# Patient Record
Sex: MALE | Race: WHITE | NOT HISPANIC OR LATINO
[De-identification: names, ages, dates, MRNs, and addresses within clinical notes are randomized per-mention and may not be internally consistent; named-entity substitution may affect disease eponyms.]

---

## 2017-12-21 ENCOUNTER — APPOINTMENT (OUTPATIENT)
Dept: HEART AND VASCULAR | Facility: CLINIC | Age: 82
End: 2017-12-21
Payer: MEDICARE

## 2017-12-21 VITALS
OXYGEN SATURATION: 98 % | SYSTOLIC BLOOD PRESSURE: 134 MMHG | DIASTOLIC BLOOD PRESSURE: 70 MMHG | WEIGHT: 156.37 LBS | TEMPERATURE: 98.1 F | HEIGHT: 67 IN | BODY MASS INDEX: 24.54 KG/M2 | HEART RATE: 45 BPM

## 2017-12-21 PROCEDURE — 93000 ELECTROCARDIOGRAM COMPLETE: CPT

## 2017-12-21 PROCEDURE — 99214 OFFICE O/P EST MOD 30 MIN: CPT | Mod: 25

## 2018-02-01 ENCOUNTER — APPOINTMENT (OUTPATIENT)
Dept: NEUROLOGY | Facility: CLINIC | Age: 83
End: 2018-02-01

## 2018-02-26 ENCOUNTER — APPOINTMENT (OUTPATIENT)
Dept: NEUROLOGY | Facility: CLINIC | Age: 83
End: 2018-02-26

## 2018-05-29 ENCOUNTER — APPOINTMENT (OUTPATIENT)
Dept: HEART AND VASCULAR | Facility: CLINIC | Age: 83
End: 2018-05-29
Payer: MEDICARE

## 2018-05-29 ENCOUNTER — TRANSCRIPTION ENCOUNTER (OUTPATIENT)
Age: 83
End: 2018-05-29

## 2018-05-29 VITALS
TEMPERATURE: 98 F | WEIGHT: 154 LBS | DIASTOLIC BLOOD PRESSURE: 60 MMHG | HEIGHT: 66.97 IN | SYSTOLIC BLOOD PRESSURE: 140 MMHG | HEART RATE: 53 BPM | OXYGEN SATURATION: 98 % | BODY MASS INDEX: 24.17 KG/M2

## 2018-05-29 PROCEDURE — 93306 TTE W/DOPPLER COMPLETE: CPT

## 2018-05-29 PROCEDURE — 99214 OFFICE O/P EST MOD 30 MIN: CPT

## 2018-10-02 ENCOUNTER — APPOINTMENT (OUTPATIENT)
Dept: HEART AND VASCULAR | Facility: CLINIC | Age: 83
End: 2018-10-02
Payer: MEDICARE

## 2018-10-02 VITALS
WEIGHT: 150 LBS | HEIGHT: 67.32 IN | BODY MASS INDEX: 23.27 KG/M2 | TEMPERATURE: 98.2 F | OXYGEN SATURATION: 97 % | SYSTOLIC BLOOD PRESSURE: 130 MMHG | HEART RATE: 49 BPM | DIASTOLIC BLOOD PRESSURE: 70 MMHG

## 2018-10-02 PROCEDURE — 99213 OFFICE O/P EST LOW 20 MIN: CPT

## 2019-01-24 ENCOUNTER — APPOINTMENT (OUTPATIENT)
Dept: OPHTHALMOLOGY | Facility: CLINIC | Age: 84
End: 2019-01-24
Payer: MEDICARE

## 2019-01-24 PROCEDURE — 92136 OPHTHALMIC BIOMETRY: CPT

## 2019-01-24 PROCEDURE — 92004 COMPRE OPH EXAM NEW PT 1/>: CPT

## 2019-03-17 ENCOUNTER — TRANSCRIPTION ENCOUNTER (OUTPATIENT)
Age: 84
End: 2019-03-17

## 2019-03-17 ENCOUNTER — EMERGENCY (EMERGENCY)
Facility: HOSPITAL | Age: 84
LOS: 1 days | Discharge: HOME | End: 2019-03-17
Admitting: EMERGENCY MEDICINE
Payer: MEDICARE

## 2019-03-17 VITALS
SYSTOLIC BLOOD PRESSURE: 166 MMHG | RESPIRATION RATE: 20 BRPM | DIASTOLIC BLOOD PRESSURE: 78 MMHG | TEMPERATURE: 98 F | OXYGEN SATURATION: 96 % | HEART RATE: 60 BPM

## 2019-03-17 DIAGNOSIS — R42 DIZZINESS AND GIDDINESS: ICD-10-CM

## 2019-03-17 DIAGNOSIS — R55 SYNCOPE AND COLLAPSE: ICD-10-CM

## 2019-03-17 DIAGNOSIS — Z87.891 PERSONAL HISTORY OF NICOTINE DEPENDENCE: ICD-10-CM

## 2019-03-17 DIAGNOSIS — R00.2 PALPITATIONS: ICD-10-CM

## 2019-03-17 DIAGNOSIS — I10 ESSENTIAL (PRIMARY) HYPERTENSION: ICD-10-CM

## 2019-03-17 DIAGNOSIS — Z88.0 ALLERGY STATUS TO PENICILLIN: ICD-10-CM

## 2019-03-17 DIAGNOSIS — R00.1 BRADYCARDIA, UNSPECIFIED: ICD-10-CM

## 2019-03-17 DIAGNOSIS — E03.9 HYPOTHYROIDISM, UNSPECIFIED: ICD-10-CM

## 2019-03-17 DIAGNOSIS — R00.8 OTHER ABNORMALITIES OF HEART BEAT: ICD-10-CM

## 2019-03-17 LAB
ALBUMIN SERPL ELPH-MCNC: 3.9 G/DL — SIGNIFICANT CHANGE UP (ref 3.4–5)
ALP SERPL-CCNC: 71 U/L — SIGNIFICANT CHANGE UP (ref 40–120)
ALT FLD-CCNC: 21 U/L — SIGNIFICANT CHANGE UP (ref 12–42)
ANION GAP SERPL CALC-SCNC: 13 MMOL/L — SIGNIFICANT CHANGE UP (ref 9–16)
AST SERPL-CCNC: 35 U/L — SIGNIFICANT CHANGE UP (ref 15–37)
BILIRUB SERPL-MCNC: 1.1 MG/DL — SIGNIFICANT CHANGE UP (ref 0.2–1.2)
BUN SERPL-MCNC: 16 MG/DL — SIGNIFICANT CHANGE UP (ref 7–23)
CALCIUM SERPL-MCNC: 9.3 MG/DL — SIGNIFICANT CHANGE UP (ref 8.5–10.5)
CHLORIDE SERPL-SCNC: 107 MMOL/L — SIGNIFICANT CHANGE UP (ref 96–108)
CO2 SERPL-SCNC: 23 MMOL/L — SIGNIFICANT CHANGE UP (ref 22–31)
CREAT SERPL-MCNC: 0.99 MG/DL — SIGNIFICANT CHANGE UP (ref 0.5–1.3)
GLUCOSE SERPL-MCNC: 102 MG/DL — HIGH (ref 70–99)
HCT VFR BLD CALC: 44.3 % — SIGNIFICANT CHANGE UP (ref 39–50)
HGB BLD-MCNC: 15.4 G/DL — SIGNIFICANT CHANGE UP (ref 13–17)
MAGNESIUM SERPL-MCNC: 2.1 MG/DL — SIGNIFICANT CHANGE UP (ref 1.6–2.6)
MCHC RBC-ENTMCNC: 32.1 PG — SIGNIFICANT CHANGE UP (ref 27–34)
MCHC RBC-ENTMCNC: 34.8 G/DL — SIGNIFICANT CHANGE UP (ref 32–36)
MCV RBC AUTO: 92.3 FL — SIGNIFICANT CHANGE UP (ref 80–100)
NT-PROBNP SERPL-SCNC: 253 PG/ML — SIGNIFICANT CHANGE UP
PLATELET # BLD AUTO: 164 K/UL — SIGNIFICANT CHANGE UP (ref 150–400)
POTASSIUM SERPL-MCNC: 4.1 MMOL/L — SIGNIFICANT CHANGE UP (ref 3.5–5.3)
POTASSIUM SERPL-SCNC: 4.1 MMOL/L — SIGNIFICANT CHANGE UP (ref 3.5–5.3)
PROT SERPL-MCNC: 7.3 G/DL — SIGNIFICANT CHANGE UP (ref 6.4–8.2)
RBC # BLD: 4.8 M/UL — SIGNIFICANT CHANGE UP (ref 4.2–5.8)
RBC # FLD: 12.8 % — SIGNIFICANT CHANGE UP (ref 10.3–14.5)
SODIUM SERPL-SCNC: 143 MMOL/L — SIGNIFICANT CHANGE UP (ref 132–145)
TROPONIN I SERPL-MCNC: <0.017 NG/ML — LOW (ref 0.02–0.06)
TSH SERPL-MCNC: 1.83 UIU/ML — SIGNIFICANT CHANGE UP (ref 0.36–3.74)
WBC # BLD: 6.9 K/UL — SIGNIFICANT CHANGE UP (ref 3.8–10.5)
WBC # FLD AUTO: 6.9 K/UL — SIGNIFICANT CHANGE UP (ref 3.8–10.5)

## 2019-03-17 PROCEDURE — 93010 ELECTROCARDIOGRAM REPORT: CPT | Mod: 76

## 2019-03-17 PROCEDURE — 71045 X-RAY EXAM CHEST 1 VIEW: CPT | Mod: 26

## 2019-03-17 PROCEDURE — 99285 EMERGENCY DEPT VISIT HI MDM: CPT | Mod: 25

## 2019-03-17 NOTE — ED ADULT NURSE NOTE - CHIEF COMPLAINT QUOTE
Patient arrives via EMS, picked up from urgent care, states this morning around 8am, patient had an episode of dizziness that passed, but then patient felt his pulse, and it felt "irregular"; once at Urgent care, patient was found to have a low HR, with possible new on-set a.fib/heart block; patient denies any symptoms at this denies; denies chest pain, shortness of breath, palpitations, dizziness/lightheadedness; states his cardiologist is associated with [facility]; received 2 baby ASA prior to arrival

## 2019-03-17 NOTE — ED PROVIDER NOTE - DIAGNOSTIC INTERPRETATION
Xray (wet reads) interpreted by ASHLEY NOLAN   CXR - Cardiac silhouette, aortic knob, mediastinal and hilar contours appear wnl, no acute consolidation, infiltrate, effusion, or PTX. No bony abnormalities noted Xray (wet reads) interpreted by ASHLEY NOLAN   CXR - Cardiac silhouette, aortic knob, and mediastinal contours appear wnl, slight hilar prominence on the R, no acute consolidation, infiltrate, effusion, or PTX. No bony abnormalities noted

## 2019-03-17 NOTE — ED PROVIDER NOTE - PHYSICAL EXAMINATION
Vital Signs - nursing notes reviewed and confirmed  Gen - WDWN M, NAD, comfortable and non-toxic appearing, speaking in full sentences   Skin - warm, dry, intact  HEENT - AT/NC, PERRL, EOMI, no conjunctival injection, moist oral mucosa, TM intact b/l with good cone of lights, o/p clear with no erythema, edema, or exudate, uvula midline, airway patent, neck supple and NT, FROM, no JVD or carotid bruits b/l, no palpable nodes  CV - S1S2, irregularly irregular, no m/r/g   Resp - respiration non-labored, CTAB, symmetric bs b/l, no r/r/w  GI - NABS, soft, ND, NT, no rebound or guarding, no CVAT b/l   MS - w/w/p, no c/c/e, calves supple and NT, distal pulses symmetric b/l, brisk cap refills, +SILT  Neuro - AxOx3, no focal neuro deficits, CN II-XII grossly intact, cerebellar function intact, ambulatory without gait disturbance Vital Signs - nursing notes reviewed and confirmed  Gen - WDWN elderly M, NAD, comfortable and non-toxic appearing, speaking in full sentences   Skin - warm, dry, intact  HEENT - AT/NC, PERRL, EOMI, no conjunctival injection, moist oral mucosa, TM intact b/l with good cone of lights, o/p clear with no erythema, edema, or exudate, uvula midline, airway patent, neck supple and NT, FROM, no JVD or carotid bruits b/l, no palpable nodes  CV - S1S2, R/R/R    Resp - respiration non-labored, CTAB, symmetric bs b/l, no r/r/w  GI - NABS, soft, ND, NT, no rebound or guarding, no CVAT b/l   MS - w/w/p, no c/c/e, calves supple and NT, distal pulses symmetric b/l, brisk cap refills, +SILT  Neuro - AxOx3, no focal neuro deficits, CN II-XII grossly intact, cerebellar function intact, ambulatory without gait disturbance

## 2019-03-17 NOTE — ED PROVIDER NOTE - OBJECTIVE STATEMENT
82 yo M with PMHx of HTN and hypothyroidism, BIBA from  for evaluation of dizziness and near syncope sensation this morning.  Pt reports waking up with dizzy spells lasting for 30 seconds or so and pt palpated his HR and noted to be irregular and went to  for further evaluation.  Had serial EKGs performed and noted marked bradycardia to 40s with PVCs and bigeminy and sent in for further evaluation.  Given 2 ASA PTA to the ED by EMS.  Denies fever, chills, CP, diaphoresis, BARRETO, SOB, orthopnea, cough, hemoptysis, wheezing, peripheral edema, focal weakness, numbness, tingling, paresthesia, HA, LOC, neck pain, N/V/D/C, abdominal pain, change in urinary/bowel function, trauma, fall, rash, and malaise. Private cardiologist - Dr. Armando Verma [phone] 84 yo M with PMHx of HTN, aortic regurg, PAF 15 yrs ago, not on any AC, BPH, and hypothyroidism, BIBA from  for evaluation of dizziness and near syncope sensation this morning.  Pt reports waking up with dizzy spells lasting for 30 seconds or so and pt palpated his HR and noted to be irregular and went to  for further evaluation.  Had serial EKGs performed and noted marked bradycardia to 40s with PVCs and bigeminy and sent in for further evaluation.  Given 2 ASA PTA to the ED by EMS.  Currently reports no active sx and back to baseline.  Denies fever, chills, CP, diaphoresis, BARRETO, SOB, orthopnea, cough, hemoptysis, wheezing, peripheral edema, focal weakness, numbness, tingling, paresthesia, HA, LOC, neck pain, N/V/D/C, abdominal pain, change in urinary/bowel function, trauma, fall, rash, and malaise. Private cardiologist - Dr. Armando Verma [phone], last TTE 6 months ago with aortic regurg but no other abnormalities, last stress test 3 yrs ago with non obstructive dz.  No recent changes in meds noted. Reports similar episode in the remote past, found to have PAF and resolved without any meds noted

## 2019-03-17 NOTE — ED ADULT TRIAGE NOTE - CHIEF COMPLAINT QUOTE
Patient arrives via EMS, picked up from urgent care, states this morning around 8am, patient had an episode of dizziness that passed, but then patient felt his pulse, and it felt "irregular"; once at Urgent care, patient was found to have a low HR, with possible new on-set a.fib/heart block; patient denies any symptoms at this denies; denies chest pain, shortness of breath, palpitations, dizziness/lightheadedness; states his cardiologist is associated with [facility]; received 2 baby ASA prior to arrival

## 2019-03-17 NOTE — ED PROVIDER NOTE - CLINICAL SUMMARY MEDICAL DECISION MAKING FREE TEXT BOX
pt p/w episode dizziness this morning with irregular HR, has baseline sinus bradycardia due to home meds, no change in meds recently, outpt EKGs at  noted bigeminy with sinus bradycardia but no A.fib noted, repeat EKG here with sinus ondina with PVCs, labs and CXR unremarkable, tele monitoring uneventful and pt reports being asymptomatic in the ED here, private cardiologist notified and made aware, s/p recent TTE outpt with baseline AR, CHADsVASc 3, pt p/w episode dizziness this morning with irregular HR, has baseline sinus bradycardia due to home meds, no change in meds recently, outpt EKGs at  noted bigeminy with sinus bradycardia but no A.fib noted, repeat EKG here with sinus ondina with PVCs, labs and CXR unremarkable, tele monitoring uneventful and pt reports being asymptomatic in the ED here, private cardiologist notified and made aware, s/p recent TTE outpt with baseline AR, case also discussed with Dr. Du, will keep home meds and prompt outpt f/u for possible holter, AFVSS at time of d/c, pt non-toxic appearing, results, ddx, and f/u plans discussed with pt at bedside, strict return precautions discussed, prompt return to ER for any worsening or new sx, pt verbalized understanding. pt p/w episode dizziness this morning with irregular HR, has baseline sinus bradycardia due to home meds, no change in meds recently, outpt EKGs at  noted bigeminy with sinus bradycardia but no A.fib noted, repeat EKG here with sinus ondina with PVCs, labs and CXR unremarkable, tele monitoring uneventful and pt reports being asymptomatic in the ED here, private cardiologist notified and made aware, s/p recent TTE outpt with baseline AR, rec decreased dose of bystolic to 5mg daily and will see pt in office on Tuesday, case also discussed with Dr. Du, will keep home meds and prompt outpt f/u for possible holter, AFVSS at time of d/c, pt non-toxic appearing, results, ddx, and f/u plans discussed with pt at bedside, strict return precautions discussed, prompt return to ER for any worsening or new sx, pt verbalized understanding.

## 2019-03-17 NOTE — ED PROVIDER NOTE - PROVIDER TOKENS
PROVIDER:[TOKEN:[[id number]],FOLLOWUP:[1-3 Days]],PROVIDER:[TOKEN:[[id number]],FOLLOWUP:[1-3 Days]]

## 2019-03-17 NOTE — ED PROVIDER NOTE - PROGRESS NOTE DETAILS
home meds - bystolic 10mg, procardia XL 30mg, synthroid 7.5mcg repeat EKG with sinus ondina at 55bpm with no ST-T wave changes

## 2019-03-17 NOTE — ED PROVIDER NOTE - CARE PROVIDERS DIRECT ADDRESSES
[email or web address]

## 2019-03-17 NOTE — ED PROVIDER NOTE - CARE PLAN
Principal Discharge DX:	Near syncope Principal Discharge DX:	Near syncope  Secondary Diagnosis:	Palpitations

## 2019-03-17 NOTE — ED ADULT NURSE NOTE - NSIMPLEMENTINTERV_GEN_ALL_ED
Implemented All Universal Safety Interventions:  [place] to call system. Call bell, personal items and telephone within reach. Instruct patient to call for assistance. Room bathroom lighting operational. Non-slip footwear when patient is off stretcher. Physically safe environment: no spills, clutter or unnecessary equipment. Stretcher in lowest position, wheels locked, appropriate side rails in place.

## 2019-03-17 NOTE — ED PROVIDER NOTE - PMH
HTN (hypertension)    Hypothyroid BPH (benign prostatic hyperplasia)    HTN (hypertension)    Hypothyroid    Paroxysmal atrial fibrillation

## 2019-03-17 NOTE — ED PROVIDER NOTE - CARE PROVIDER_API CALL
Armando Verma)  Cardiovascular Disease; Internal Medicine  [address] [address]  Phone: [phone]  Fax: [phone]  Follow Up Time: 1-3 Days    Delmar Du)  Cardiovascular Disease; Internal Medicine  [address] [facility] [place] [address]  Phone: [phone]  Fax: [phone]  Follow Up Time: 1-3 Days

## 2019-03-19 ENCOUNTER — RX RENEWAL (OUTPATIENT)
Age: 84
End: 2019-03-19

## 2019-03-19 ENCOUNTER — APPOINTMENT (OUTPATIENT)
Dept: HEART AND VASCULAR | Facility: CLINIC | Age: 84
End: 2019-03-19
Payer: MEDICARE

## 2019-03-19 VITALS
WEIGHT: 145.99 LBS | OXYGEN SATURATION: 97 % | DIASTOLIC BLOOD PRESSURE: 60 MMHG | TEMPERATURE: 98.3 F | HEART RATE: 50 BPM | SYSTOLIC BLOOD PRESSURE: 122 MMHG | BODY MASS INDEX: 22.65 KG/M2 | HEIGHT: 67.32 IN

## 2019-03-19 DIAGNOSIS — E03.9 HYPOTHYROIDISM, UNSPECIFIED: ICD-10-CM

## 2019-03-19 PROCEDURE — 99214 OFFICE O/P EST MOD 30 MIN: CPT

## 2019-03-19 PROCEDURE — 93000 ELECTROCARDIOGRAM COMPLETE: CPT

## 2019-03-19 RX ORDER — LEVOTHYROXINE SODIUM 0.07 MG/1
75 TABLET ORAL DAILY
Qty: 90 | Refills: 3 | Status: ACTIVE | COMMUNITY
Start: 1900-01-01 | End: 1900-01-01

## 2019-03-19 NOTE — REASON FOR VISIT
[FreeTextEntry1] : 2 nights ago at home had some dizziness and became aware of fast and/or irregular pulse.  Went to [facility] where there were no clinical findings other than a single VPC.  He was bradycardiac (which he has been for the past 10 years) and I reduced Bystolic to 5 mg od.\par \par EKG is sinus ondina, rate 50 1 VPC

## 2019-03-19 NOTE — PHYSICAL EXAM

## 2019-04-02 ENCOUNTER — APPOINTMENT (OUTPATIENT)
Dept: HEART AND VASCULAR | Facility: CLINIC | Age: 84
End: 2019-04-02
Payer: MEDICARE

## 2019-04-02 VITALS
SYSTOLIC BLOOD PRESSURE: 139 MMHG | HEART RATE: 49 BPM | WEIGHT: 139 LBS | OXYGEN SATURATION: 97 % | TEMPERATURE: 98.6 F | BODY MASS INDEX: 21.56 KG/M2 | DIASTOLIC BLOOD PRESSURE: 67 MMHG | HEIGHT: 67.32 IN

## 2019-04-02 PROBLEM — I10 ESSENTIAL (PRIMARY) HYPERTENSION: Chronic | Status: ACTIVE | Noted: 2019-03-17

## 2019-04-02 PROBLEM — E03.9 HYPOTHYROIDISM, UNSPECIFIED: Chronic | Status: ACTIVE | Noted: 2019-03-17

## 2019-04-02 PROBLEM — N40.0 BENIGN PROSTATIC HYPERPLASIA WITHOUT LOWER URINARY TRACT SYMPTOMS: Chronic | Status: ACTIVE | Noted: 2019-03-17

## 2019-04-02 PROCEDURE — 99214 OFFICE O/P EST MOD 30 MIN: CPT

## 2019-04-02 PROCEDURE — 93000 ELECTROCARDIOGRAM COMPLETE: CPT

## 2019-04-02 NOTE — REASON FOR VISIT
[FreeTextEntry1] : 83 M HTN  was dizzy checked his BP it was in the 170's . wants to know if he needs more meds.  \par \par EKG is sinus ondina, rate 50 1 VPC

## 2019-04-02 NOTE — ASSESSMENT
[FreeTextEntry1] : htn acceptable today taught him how to properly check bp\par ambulatory blood pressure monitoring no change to meds at this time.

## 2019-04-02 NOTE — PHYSICAL EXAM
[General Appearance - Well Developed] : well developed [Normal Appearance] : normal appearance [Well Groomed] : well groomed [General Appearance - Well Nourished] : well nourished [No Deformities] : no deformities [General Appearance - In No Acute Distress] : no acute distress [Normal Conjunctiva] : the conjunctiva exhibited no abnormalities [Eyelids - No Xanthelasma] : the eyelids demonstrated no xanthelasmas [Normal Oral Mucosa] : normal oral mucosa [No Oral Pallor] : no oral pallor [No Oral Cyanosis] : no oral cyanosis [Normal Jugular Venous A Waves Present] : normal jugular venous A waves present [Normal Jugular Venous V Waves Present] : normal jugular venous V waves present [No Jugular Venous Wolfe A Waves] : no jugular venous wolfe A waves [Respiration, Rhythm And Depth] : normal respiratory rhythm and effort [Exaggerated Use Of Accessory Muscles For Inspiration] : no accessory muscle use [Auscultation Breath Sounds / Voice Sounds] : lungs were clear to auscultation bilaterally [Heart Rate And Rhythm] : heart rate and rhythm were normal [Heart Sounds] : normal S1 and S2 [Murmurs] : no murmurs present [Abdomen Soft] : soft [Abdomen Tenderness] : non-tender [Abdomen Mass (___ Cm)] : no abdominal mass palpated [Abnormal Walk] : normal gait [Gait - Sufficient For Exercise Testing] : the gait was sufficient for exercise testing [Nail Clubbing] : no clubbing of the fingernails [Cyanosis, Localized] : no localized cyanosis [Petechial Hemorrhages (___cm)] : no petechial hemorrhages [Skin Color & Pigmentation] : normal skin color and pigmentation [] : no rash [No Venous Stasis] : no venous stasis [Skin Lesions] : no skin lesions [No Skin Ulcers] : no skin ulcer [No Xanthoma] : no  xanthoma was observed [Oriented To Time, Place, And Person] : oriented to person, place, and time [Affect] : the affect was normal [Mood] : the mood was normal [No Anxiety] : not feeling anxious

## 2019-04-11 ENCOUNTER — APPOINTMENT (OUTPATIENT)
Dept: HEART AND VASCULAR | Facility: CLINIC | Age: 84
End: 2019-04-11
Payer: MEDICARE

## 2019-04-11 PROCEDURE — 93784 AMBL BP MNTR W/SOFTWARE: CPT

## 2019-05-23 ENCOUNTER — APPOINTMENT (OUTPATIENT)
Dept: OPHTHALMOLOGY | Facility: CLINIC | Age: 84
End: 2019-05-23
Payer: MEDICARE

## 2019-05-23 DIAGNOSIS — H25.13 AGE-RELATED NUCLEAR CATARACT, BILATERAL: ICD-10-CM

## 2019-05-23 PROCEDURE — 92012 INTRM OPH EXAM EST PATIENT: CPT

## 2019-06-18 ENCOUNTER — APPOINTMENT (OUTPATIENT)
Dept: HEART AND VASCULAR | Facility: CLINIC | Age: 84
End: 2019-06-18
Payer: MEDICARE

## 2019-06-18 VITALS
TEMPERATURE: 97.8 F | HEIGHT: 67.32 IN | DIASTOLIC BLOOD PRESSURE: 50 MMHG | BODY MASS INDEX: 22.8 KG/M2 | SYSTOLIC BLOOD PRESSURE: 100 MMHG | WEIGHT: 146.98 LBS | HEART RATE: 49 BPM | OXYGEN SATURATION: 98 %

## 2019-06-18 PROCEDURE — 93000 ELECTROCARDIOGRAM COMPLETE: CPT

## 2019-06-18 PROCEDURE — 99214 OFFICE O/P EST MOD 30 MIN: CPT

## 2019-06-18 NOTE — ASSESSMENT
[FreeTextEntry1] : BP  is a little low.  I suspect he no longer needs bystolic - will dc, see him in a few weeks and repeat echo.

## 2019-06-18 NOTE — REASON FOR VISIT
[FreeTextEntry1] : He has had no further dizziness.  Holter confirmed sinus ondina but no other findings.

## 2019-06-18 NOTE — PHYSICAL EXAM
[General Appearance - Well Developed] : well developed [Normal Appearance] : normal appearance [Well Groomed] : well groomed [General Appearance - Well Nourished] : well nourished [No Deformities] : no deformities [General Appearance - In No Acute Distress] : no acute distress [Normal Conjunctiva] : the conjunctiva exhibited no abnormalities [Eyelids - No Xanthelasma] : the eyelids demonstrated no xanthelasmas [Normal Oral Mucosa] : normal oral mucosa [No Oral Pallor] : no oral pallor [No Oral Cyanosis] : no oral cyanosis [Normal Jugular Venous A Waves Present] : normal jugular venous A waves present [Normal Jugular Venous V Waves Present] : normal jugular venous V waves present [No Jugular Venous Wolfe A Waves] : no jugular venous wolfe A waves [Respiration, Rhythm And Depth] : normal respiratory rhythm and effort [Heart Rate And Rhythm] : heart rate and rhythm were normal [Auscultation Breath Sounds / Voice Sounds] : lungs were clear to auscultation bilaterally [Exaggerated Use Of Accessory Muscles For Inspiration] : no accessory muscle use [Heart Sounds] : normal S1 and S2 [Murmurs] : no murmurs present [Abdomen Soft] : soft [Abdomen Tenderness] : non-tender [Abnormal Walk] : normal gait [Abdomen Mass (___ Cm)] : no abdominal mass palpated [Gait - Sufficient For Exercise Testing] : the gait was sufficient for exercise testing [Nail Clubbing] : no clubbing of the fingernails [Cyanosis, Localized] : no localized cyanosis [Petechial Hemorrhages (___cm)] : no petechial hemorrhages [Skin Color & Pigmentation] : normal skin color and pigmentation [] : no rash [No Venous Stasis] : no venous stasis [No Skin Ulcers] : no skin ulcer [Skin Lesions] : no skin lesions [Affect] : the affect was normal [No Xanthoma] : no  xanthoma was observed [Oriented To Time, Place, And Person] : oriented to person, place, and time [No Anxiety] : not feeling anxious [Mood] : the mood was normal

## 2019-07-02 ENCOUNTER — APPOINTMENT (OUTPATIENT)
Dept: HEART AND VASCULAR | Facility: CLINIC | Age: 84
End: 2019-07-02

## 2019-07-09 ENCOUNTER — APPOINTMENT (OUTPATIENT)
Dept: HEART AND VASCULAR | Facility: CLINIC | Age: 84
End: 2019-07-09
Payer: MEDICARE

## 2019-07-09 VITALS
OXYGEN SATURATION: 97 % | BODY MASS INDEX: 23.27 KG/M2 | HEART RATE: 52 BPM | HEIGHT: 67.32 IN | TEMPERATURE: 98.1 F | DIASTOLIC BLOOD PRESSURE: 62 MMHG | SYSTOLIC BLOOD PRESSURE: 110 MMHG | WEIGHT: 150 LBS

## 2019-07-09 PROCEDURE — 93306 TTE W/DOPPLER COMPLETE: CPT

## 2019-07-09 PROCEDURE — 99213 OFFICE O/P EST LOW 20 MIN: CPT

## 2019-07-09 NOTE — PHYSICAL EXAM
[General Appearance - Well Developed] : well developed [Normal Appearance] : normal appearance [Well Groomed] : well groomed [General Appearance - Well Nourished] : well nourished [No Deformities] : no deformities [General Appearance - In No Acute Distress] : no acute distress [Normal Conjunctiva] : the conjunctiva exhibited no abnormalities [Eyelids - No Xanthelasma] : the eyelids demonstrated no xanthelasmas [Normal Oral Mucosa] : normal oral mucosa [No Oral Pallor] : no oral pallor [No Oral Cyanosis] : no oral cyanosis [Normal Jugular Venous A Waves Present] : normal jugular venous A waves present [Normal Jugular Venous V Waves Present] : normal jugular venous V waves present [No Jugular Venous Wolfe A Waves] : no jugular venous wolfe A waves [Exaggerated Use Of Accessory Muscles For Inspiration] : no accessory muscle use [Respiration, Rhythm And Depth] : normal respiratory rhythm and effort [Heart Sounds] : normal S1 and S2 [Auscultation Breath Sounds / Voice Sounds] : lungs were clear to auscultation bilaterally [Heart Rate And Rhythm] : heart rate and rhythm were normal [Abdomen Soft] : soft [Murmurs] : no murmurs present [Abdomen Tenderness] : non-tender [Abdomen Mass (___ Cm)] : no abdominal mass palpated [Abnormal Walk] : normal gait [Gait - Sufficient For Exercise Testing] : the gait was sufficient for exercise testing [Nail Clubbing] : no clubbing of the fingernails [Cyanosis, Localized] : no localized cyanosis [Petechial Hemorrhages (___cm)] : no petechial hemorrhages [] : no rash [Skin Color & Pigmentation] : normal skin color and pigmentation [Skin Lesions] : no skin lesions [No Venous Stasis] : no venous stasis [No Xanthoma] : no  xanthoma was observed [No Skin Ulcers] : no skin ulcer [Oriented To Time, Place, And Person] : oriented to person, place, and time [Affect] : the affect was normal [Mood] : the mood was normal [No Anxiety] : not feeling anxious

## 2019-07-09 NOTE — REASON FOR VISIT
[Hypertension] : hypertension [FreeTextEntry1] : His bystolic had been dc'd, but he felt that his HR was uncomfortably high and resumed it at a dose of 2.5 mg.  Feels fine now.

## 2019-07-12 ENCOUNTER — INPATIENT (INPATIENT)
Facility: HOSPITAL | Age: 84
LOS: 0 days | Discharge: HOME | DRG: 247 | End: 2019-07-13
Attending: INTERNAL MEDICINE | Admitting: INTERNAL MEDICINE
Payer: COMMERCIAL

## 2019-07-12 VITALS
WEIGHT: 152.56 LBS | SYSTOLIC BLOOD PRESSURE: 114 MMHG | DIASTOLIC BLOOD PRESSURE: 67 MMHG | HEART RATE: 75 BPM | TEMPERATURE: 98 F | OXYGEN SATURATION: 97 % | RESPIRATION RATE: 18 BRPM

## 2019-07-12 DIAGNOSIS — I10 ESSENTIAL (PRIMARY) HYPERTENSION: ICD-10-CM

## 2019-07-12 DIAGNOSIS — R00.2 PALPITATIONS: ICD-10-CM

## 2019-07-12 DIAGNOSIS — E03.9 HYPOTHYROIDISM, UNSPECIFIED: ICD-10-CM

## 2019-07-12 LAB
ALBUMIN SERPL ELPH-MCNC: 4.2 G/DL — SIGNIFICANT CHANGE UP (ref 3.3–5)
ALP SERPL-CCNC: 71 U/L — SIGNIFICANT CHANGE UP (ref 40–120)
ALT FLD-CCNC: 17 U/L — SIGNIFICANT CHANGE UP (ref 10–45)
ANION GAP SERPL CALC-SCNC: 9 MMOL/L — SIGNIFICANT CHANGE UP (ref 5–17)
APTT BLD: 35.4 SEC — SIGNIFICANT CHANGE UP (ref 27.5–36.3)
AST SERPL-CCNC: 26 U/L — SIGNIFICANT CHANGE UP (ref 10–40)
BILIRUB SERPL-MCNC: 0.8 MG/DL — SIGNIFICANT CHANGE UP (ref 0.2–1.2)
BUN SERPL-MCNC: 17 MG/DL — SIGNIFICANT CHANGE UP (ref 7–23)
CALCIUM SERPL-MCNC: 9.5 MG/DL — SIGNIFICANT CHANGE UP (ref 8.4–10.5)
CHLORIDE SERPL-SCNC: 111 MMOL/L — HIGH (ref 96–108)
CK MB CFR SERPL CALC: 11.1 NG/ML — HIGH (ref 0–6.7)
CK SERPL-CCNC: 309 U/L — HIGH (ref 30–200)
CO2 SERPL-SCNC: 26 MMOL/L — SIGNIFICANT CHANGE UP (ref 22–31)
CREAT SERPL-MCNC: 0.91 MG/DL — SIGNIFICANT CHANGE UP (ref 0.5–1.3)
GLUCOSE SERPL-MCNC: 120 MG/DL — HIGH (ref 70–99)
HCT VFR BLD CALC: 46.5 % — SIGNIFICANT CHANGE UP (ref 39–50)
HGB BLD-MCNC: 15.7 G/DL — SIGNIFICANT CHANGE UP (ref 13–17)
INR BLD: 1.09 — SIGNIFICANT CHANGE UP (ref 0.88–1.16)
MCHC RBC-ENTMCNC: 31.8 PG — SIGNIFICANT CHANGE UP (ref 27–34)
MCHC RBC-ENTMCNC: 33.8 GM/DL — SIGNIFICANT CHANGE UP (ref 32–36)
MCV RBC AUTO: 94.1 FL — SIGNIFICANT CHANGE UP (ref 80–100)
NRBC # BLD: 0 /100 WBCS — SIGNIFICANT CHANGE UP (ref 0–0)
PLATELET # BLD AUTO: 170 K/UL — SIGNIFICANT CHANGE UP (ref 150–400)
POTASSIUM SERPL-MCNC: 4.1 MMOL/L — SIGNIFICANT CHANGE UP (ref 3.5–5.3)
POTASSIUM SERPL-SCNC: 4.1 MMOL/L — SIGNIFICANT CHANGE UP (ref 3.5–5.3)
PROT SERPL-MCNC: 7.1 G/DL — SIGNIFICANT CHANGE UP (ref 6–8.3)
PROTHROM AB SERPL-ACNC: 12.4 SEC — SIGNIFICANT CHANGE UP (ref 10–12.9)
RBC # BLD: 4.94 M/UL — SIGNIFICANT CHANGE UP (ref 4.2–5.8)
RBC # FLD: 12.5 % — SIGNIFICANT CHANGE UP (ref 10.3–14.5)
SODIUM SERPL-SCNC: 146 MMOL/L — HIGH (ref 135–145)
TROPONIN T SERPL-MCNC: 0.04 NG/ML — CRITICAL HIGH (ref 0–0.01)
WBC # BLD: 6.04 K/UL — SIGNIFICANT CHANGE UP (ref 3.8–10.5)
WBC # FLD AUTO: 6.04 K/UL — SIGNIFICANT CHANGE UP (ref 3.8–10.5)

## 2019-07-12 PROCEDURE — 99223 1ST HOSP IP/OBS HIGH 75: CPT | Mod: AI

## 2019-07-12 PROCEDURE — 71046 X-RAY EXAM CHEST 2 VIEWS: CPT | Mod: 26

## 2019-07-12 PROCEDURE — 99285 EMERGENCY DEPT VISIT HI MDM: CPT | Mod: 25

## 2019-07-12 PROCEDURE — 93458 L HRT ARTERY/VENTRICLE ANGIO: CPT | Mod: 26,59

## 2019-07-12 PROCEDURE — 75571 CT HRT W/O DYE W/CA TEST: CPT | Mod: 26

## 2019-07-12 PROCEDURE — 92941 PRQ TRLML REVSC TOT OCCL AMI: CPT | Mod: LD

## 2019-07-12 RX ORDER — LEVOTHYROXINE SODIUM 125 MCG
75 TABLET ORAL DAILY
Refills: 0 | Status: DISCONTINUED | OUTPATIENT
Start: 2019-07-12 | End: 2019-07-13

## 2019-07-12 RX ORDER — ACETAMINOPHEN 500 MG
325 TABLET ORAL ONCE
Refills: 0 | Status: COMPLETED | OUTPATIENT
Start: 2019-07-12 | End: 2019-07-13

## 2019-07-12 RX ORDER — ASPIRIN/CALCIUM CARB/MAGNESIUM 324 MG
324 TABLET ORAL ONCE
Refills: 0 | Status: COMPLETED | OUTPATIENT
Start: 2019-07-12 | End: 2019-07-12

## 2019-07-12 RX ORDER — CLOPIDOGREL BISULFATE 75 MG/1
600 TABLET, FILM COATED ORAL ONCE
Refills: 0 | Status: DISCONTINUED | OUTPATIENT
Start: 2019-07-12 | End: 2019-07-12

## 2019-07-12 RX ORDER — ATORVASTATIN CALCIUM 80 MG/1
80 TABLET, FILM COATED ORAL AT BEDTIME
Refills: 0 | Status: DISCONTINUED | OUTPATIENT
Start: 2019-07-12 | End: 2019-07-13

## 2019-07-12 RX ORDER — LEVOTHYROXINE SODIUM 125 MCG
0 TABLET ORAL
Qty: 0 | Refills: 0 | DISCHARGE

## 2019-07-12 RX ORDER — NEBIVOLOL HYDROCHLORIDE 5 MG/1
2.5 TABLET ORAL DAILY
Refills: 0 | Status: DISCONTINUED | OUTPATIENT
Start: 2019-07-13 | End: 2019-07-13

## 2019-07-12 RX ORDER — SODIUM CHLORIDE 9 MG/ML
500 INJECTION INTRAMUSCULAR; INTRAVENOUS; SUBCUTANEOUS
Refills: 0 | Status: DISCONTINUED | OUTPATIENT
Start: 2019-07-12 | End: 2019-07-13

## 2019-07-12 RX ORDER — UBIDECARENONE 100 MG
0 CAPSULE ORAL
Qty: 0 | Refills: 0 | DISCHARGE

## 2019-07-12 RX ORDER — NIFEDIPINE 30 MG
30 TABLET, EXTENDED RELEASE 24 HR ORAL EVERY 12 HOURS
Refills: 0 | Status: DISCONTINUED | OUTPATIENT
Start: 2019-07-12 | End: 2019-07-13

## 2019-07-12 RX ORDER — FINASTERIDE 5 MG/1
5 TABLET, FILM COATED ORAL DAILY
Refills: 0 | Status: DISCONTINUED | OUTPATIENT
Start: 2019-07-12 | End: 2019-07-13

## 2019-07-12 RX ORDER — LANOLIN ALCOHOL/MO/W.PET/CERES
3 CREAM (GRAM) TOPICAL AT BEDTIME
Refills: 0 | Status: DISCONTINUED | OUTPATIENT
Start: 2019-07-12 | End: 2019-07-13

## 2019-07-12 RX ORDER — CLOPIDOGREL BISULFATE 75 MG/1
75 TABLET, FILM COATED ORAL DAILY
Refills: 0 | Status: DISCONTINUED | OUTPATIENT
Start: 2019-07-13 | End: 2019-07-13

## 2019-07-12 RX ORDER — ASPIRIN/CALCIUM CARB/MAGNESIUM 324 MG
81 TABLET ORAL DAILY
Refills: 0 | Status: DISCONTINUED | OUTPATIENT
Start: 2019-07-13 | End: 2019-07-13

## 2019-07-12 RX ORDER — NIFEDIPINE 30 MG
0 TABLET, EXTENDED RELEASE 24 HR ORAL
Qty: 0 | Refills: 0 | DISCHARGE

## 2019-07-12 RX ORDER — NEBIVOLOL HYDROCHLORIDE 5 MG/1
0 TABLET ORAL
Qty: 0 | Refills: 0 | DISCHARGE

## 2019-07-12 RX ORDER — TAMSULOSIN HYDROCHLORIDE 0.4 MG/1
0.8 CAPSULE ORAL AT BEDTIME
Refills: 0 | Status: DISCONTINUED | OUTPATIENT
Start: 2019-07-12 | End: 2019-07-13

## 2019-07-12 RX ADMIN — Medication 3 MILLIGRAM(S): at 22:29

## 2019-07-12 RX ADMIN — Medication 324 MILLIGRAM(S): at 09:51

## 2019-07-12 RX ADMIN — SODIUM CHLORIDE 75 MILLILITER(S): 9 INJECTION INTRAMUSCULAR; INTRAVENOUS; SUBCUTANEOUS at 18:58

## 2019-07-12 RX ADMIN — FINASTERIDE 5 MILLIGRAM(S): 5 TABLET, FILM COATED ORAL at 19:13

## 2019-07-12 RX ADMIN — Medication 30 MILLIGRAM(S): at 21:54

## 2019-07-12 RX ADMIN — TAMSULOSIN HYDROCHLORIDE 0.8 MILLIGRAM(S): 0.4 CAPSULE ORAL at 21:54

## 2019-07-12 NOTE — H&P ADULT - ASSESSMENT
85yo male, Fam Hx of MI (father age 60s) with PMHx of HTN, Aortic regurgitation, Hx of palpitations (w/ recent Holter monitor revealing sinus bradycardia per allscrinavneet MD note), hypothyroid, TIA 5yrs ago, Current prostate CA (under surveillance) who presented to [facility] ED 7/12 with c/o palpitations and chest pain, s/p CTA coronaries revealing elevated calcium and admitted for Cath

## 2019-07-12 NOTE — ED PROVIDER NOTE - OBJECTIVE STATEMENT
84M pmh afib, HTN, aortic regurg, PAF 15 yrs ago, no AC, BPH, hypothyroidism, p/w palpitations since last night, resolved this am 30m pta. Also noted some mild generalized nonradiating chest "fullness" this am, noted since he woke up and also resolved approx 30m pta (as pt was getting in cab). No sob, no radiation, no nausea, no vomiting, no diaphoresis. No lightheadedness, tried valsalva last night w/o success. Discused w/ his cardiologist this am Armando Verma, advised come to ed given continued symptoms. Upon arrival, pt requesting discharge w/in 90 minutes for a meeting.   no abd pain, f/c, cough, sob, n/v.

## 2019-07-12 NOTE — ED ADULT NURSE NOTE - OBJECTIVE STATEMENT
Pt presents to the ER complaining of palpitations and chest discomfort. Pt says, "I was in atrial fibrillation for 7 hours last night and I have aortic valve regurgitation. My chest felt full 20 minutes after I got out of bed." Pt was stable upon arrival. Pt denies dizziness, lightheadedness, fever, chills, SOB, chest tightness, nausea, vomiting.

## 2019-07-12 NOTE — H&P ADULT - HISTORY OF PRESENT ILLNESS
83yo male with PMHx of HTN, Aortic regurgitation, Hx of palpitations (w/ Holter monitor in recently), hypothyroid, Current prostate CA (under surveilance) who presented to [facility] ED 7/12 with c/o palpitations and chest pain.  Per patient palpitations began last night around 11pm described a frequent and intermittent heart beats.  He eventually was able to fall asleep but again awoke up the palpitations this morning.  Patient also with c/o mild chest pressure upon standing from bed lasting several minutes and resolved on its own.  Patient also with chronic LE swelling.      On arrival to [facility] ED palpitations now resolved.  EKG revealing Sinus bradycardia 83yo male with PMHx of HTN, Aortic regurgitation, Hx of palpitations (w/ recent Holter monitor revealing sinus bradycardia per sebastian KAUFFMAN note), hypothyroid, TIA 5yrs ago, Current prostate CA (under surveillance) who presented to [facility] ED 7/12 with c/o palpitations and chest pain.  Per patient palpitations began last night around 11pm described a frequent and intermittent heart beats.  He eventually was able to fall asleep but again awoke up the palpitations this morning.  Patient also with c/o mild chest pressure upon standing from bed lasting several minutes and resolved on its own.  Patient also with chronic LE swelling.  He denies any syncope, SOB, orthopnea, PND, fevers, chills.  On arrival to [facility] ED palpitations resolved.  EKG revealing Sinus bradycardia w/ TWI lead II, and minimal ST depression AVF.  VSS.  CXR clear lungs.  Labs significant for elevated Troponin 0.05.  He received ASA 324mg PO x1.  Patient subsequently underwent CTA Calcium score w/ significantly elevated CA score (pre borjas read).  He is admitted for cardiac catheterization. 83yo male, Fam Hx of MI (father age 60s) with PMHx of HTN, Aortic regurgitation, Hx of palpitations (w/ recent Holter monitor revealing sinus bradycardia per sebastian KAUFFMAN note), hypothyroid, TIA 5yrs ago, Current prostate CA (under surveillance) who presented to [facility] ED 7/12 with c/o palpitations and chest pain.  Per patient palpitations began last night around 11pm described a frequent and intermittent heart beats.  He eventually was able to fall asleep but again awoke up the palpitations this morning.  Patient also with c/o mild chest pressure upon standing from bed lasting several minutes and resolved on its own.  Patient also with chronic LE swelling.  He denies any syncope, SOB, orthopnea, PND, fevers, chills.  Patient recently underwent an Echo 7/9/19 revealing normal Right and Left systolic function, EF 65-70%, mild to moderate AR, Ascending aorta 3.7cm.  On arrival to [facility] ED palpitations resolved.  EKG revealing Sinus bradycardia w/ TWI lead II, and minimal ST depression AVF.  VSS.  CXR clear lungs.  Labs significant for elevated Troponin 0.05.  He received ASA 324mg PO x1.  Patient subsequently underwent CTA Calcium score w/ significantly elevated CA score (pre borjas read).  He is admitted for cardiac catheterization.

## 2019-07-12 NOTE — H&P ADULT - ATTENDING COMMENTS
Assessment: Patient personally seen and examined myself during rounds with the   Physician Assistant  ON DATE 7/21/19    Note read, including vitals, physical findings, laboratory data, and radiological reports.   Agree with above and revisions, if any, included below.  - New ACS-chest pain  - Severe chest pain  - Unstable hemodynamics  - Plan of Care: continuous telemetry monitoring for arrhythmias, echocardiogram to evaluate ejection fraction and central pressures, daily weights and I/Os, serial EKGs and lab work to evaluate and replete potassium, magnesium. Troponin added to labs and will cycle. Will need to be seen by EP or Interv Cardio if intervention needed.

## 2019-07-12 NOTE — ED PROVIDER NOTE - CLINICAL SUMMARY MEDICAL DECISION MAKING FREE TEXT BOX
84M pmh afib, HTN, aortic regurg, PAF 15 yrs ago, no AC, BPH, hypothyroidism, p/w palpitations since last night, resolved this am 30m pta and chest "fullness" or discomfort. No acute findings on exam. EKG noted for sinus rhythm, TWI lead III.

## 2019-07-12 NOTE — H&P ADULT - PROBLEM SELECTOR PLAN 3
"Justice Galdamez is a 4 year old male.      Chief Complaint   Patient presents with     Urgent Care     Derm Problem     pt is here for a rash on his back and side - has been there for sometime now and is not getting any better - Mom noticed it over a week ago        Initial Pulse 104  Temp 97.8  F (36.6  C) (Axillary)  Resp 20  Wt 30 lb (13.6 kg)  SpO2 98% Estimated body mass index is 14.89 kg/(m^2) as calculated from the following:    Height as of 5/31/17: 3' 1\" (0.94 m).    Weight as of 5/31/17: 29 lb (13.2 kg).  Medication Reconciliation: complete      Questioned patient about current smoking habits.  Pt. no exposure to second hand smoke.      Erika Mosley CMA      " Continue levothyroxine.

## 2019-07-12 NOTE — ED ADULT NURSE NOTE - CHPI ED NUR SYMPTOMS NEG
no back pain/no congestion/no fever/no nausea/no syncope/no vomiting/no diaphoresis/no dizziness/no chest pain/no chills

## 2019-07-12 NOTE — ED PROVIDER NOTE - DIAGNOSTIC INTERPRETATION
Chest x-ray interpreted by ER Physician Dr. Muse  Findings: heart size normal, no infiltrates, no pleural effusion, no PTX, no appreciated fracture.

## 2019-07-12 NOTE — H&P ADULT - PROBLEM SELECTOR PLAN 1
palpitations and chest pain resolved.  - CE 0.05 x1  - s/p Calcium Score w/ elevated calcium (per attending read almost 2000, w/ significant amount in LAD)  - NPO for Cardiac Cath.  Consented.  on scheduled with Dr. Rey.  s/p ASA 325mg and Plavix 600mg  - recent Echo 7/9/19 if office (allscripts) revealing EF 65-70%, mild to moderate AI.   - f/u Hgb A1c and Lipid panel

## 2019-07-12 NOTE — ED ADULT NURSE NOTE - NSIMPLEMENTINTERV_GEN_ALL_ED
Implemented All Universal Safety Interventions:  [place] to call system. Call bell, personal items and telephone within reach. Instruct patient to call for assistance. Room bathroom lighting operational. Non-slip footwear when patient is off stretcher. Physically safe environment: no spills, clutter or unnecessary equipment. Stretcher in lowest position, wheels locked, appropriate side rails in place.

## 2019-07-13 ENCOUNTER — TRANSCRIPTION ENCOUNTER (OUTPATIENT)
Age: 84
End: 2019-07-13

## 2019-07-13 VITALS — TEMPERATURE: 98 F

## 2019-07-13 LAB
ANION GAP SERPL CALC-SCNC: 11 MMOL/L — SIGNIFICANT CHANGE UP (ref 5–17)
BUN SERPL-MCNC: 12 MG/DL — SIGNIFICANT CHANGE UP (ref 7–23)
CALCIUM SERPL-MCNC: 9 MG/DL — SIGNIFICANT CHANGE UP (ref 8.4–10.5)
CHLORIDE SERPL-SCNC: 108 MMOL/L — SIGNIFICANT CHANGE UP (ref 96–108)
CHOLEST SERPL-MCNC: 189 MG/DL — SIGNIFICANT CHANGE UP (ref 10–199)
CO2 SERPL-SCNC: 24 MMOL/L — SIGNIFICANT CHANGE UP (ref 22–31)
CREAT SERPL-MCNC: 0.74 MG/DL — SIGNIFICANT CHANGE UP (ref 0.5–1.3)
GLUCOSE SERPL-MCNC: 117 MG/DL — HIGH (ref 70–99)
HBA1C BLD-MCNC: 5 % — SIGNIFICANT CHANGE UP (ref 4–5.6)
HCT VFR BLD CALC: 43.7 % — SIGNIFICANT CHANGE UP (ref 39–50)
HDLC SERPL-MCNC: 48 MG/DL — SIGNIFICANT CHANGE UP
HGB BLD-MCNC: 14.7 G/DL — SIGNIFICANT CHANGE UP (ref 13–17)
LIPID PNL WITH DIRECT LDL SERPL: 114 MG/DL — HIGH
MAGNESIUM SERPL-MCNC: 2 MG/DL — SIGNIFICANT CHANGE UP (ref 1.6–2.6)
MCHC RBC-ENTMCNC: 32 PG — SIGNIFICANT CHANGE UP (ref 27–34)
MCHC RBC-ENTMCNC: 33.6 GM/DL — SIGNIFICANT CHANGE UP (ref 32–36)
MCV RBC AUTO: 95.2 FL — SIGNIFICANT CHANGE UP (ref 80–100)
NRBC # BLD: 0 /100 WBCS — SIGNIFICANT CHANGE UP (ref 0–0)
PLATELET # BLD AUTO: 142 K/UL — LOW (ref 150–400)
POTASSIUM SERPL-MCNC: 3.5 MMOL/L — SIGNIFICANT CHANGE UP (ref 3.5–5.3)
POTASSIUM SERPL-SCNC: 3.5 MMOL/L — SIGNIFICANT CHANGE UP (ref 3.5–5.3)
RBC # BLD: 4.59 M/UL — SIGNIFICANT CHANGE UP (ref 4.2–5.8)
RBC # FLD: 12.7 % — SIGNIFICANT CHANGE UP (ref 10.3–14.5)
SODIUM SERPL-SCNC: 143 MMOL/L — SIGNIFICANT CHANGE UP (ref 135–145)
TOTAL CHOLESTEROL/HDL RATIO MEASUREMENT: 3.9 RATIO — SIGNIFICANT CHANGE UP (ref 3.4–9.6)
TRIGL SERPL-MCNC: 136 MG/DL — SIGNIFICANT CHANGE UP (ref 10–149)
WBC # BLD: 5.87 K/UL — SIGNIFICANT CHANGE UP (ref 3.8–10.5)
WBC # FLD AUTO: 5.87 K/UL — SIGNIFICANT CHANGE UP (ref 3.8–10.5)

## 2019-07-13 PROCEDURE — 76775 US EXAM ABDO BACK WALL LIM: CPT | Mod: 26

## 2019-07-13 PROCEDURE — 99238 HOSP IP/OBS DSCHRG MGMT 30/<: CPT

## 2019-07-13 PROCEDURE — 70498 CT ANGIOGRAPHY NECK: CPT | Mod: 26

## 2019-07-13 PROCEDURE — 70496 CT ANGIOGRAPHY HEAD: CPT | Mod: 26

## 2019-07-13 RX ORDER — NEBIVOLOL HYDROCHLORIDE 5 MG/1
1 TABLET ORAL
Qty: 30 | Refills: 2
Start: 2019-07-13 | End: 2019-10-10

## 2019-07-13 RX ORDER — POTASSIUM CHLORIDE 20 MEQ
40 PACKET (EA) ORAL ONCE
Refills: 0 | Status: COMPLETED | OUTPATIENT
Start: 2019-07-13 | End: 2019-07-13

## 2019-07-13 RX ORDER — ASPIRIN/CALCIUM CARB/MAGNESIUM 324 MG
1 TABLET ORAL
Qty: 30 | Refills: 11
Start: 2019-07-13 | End: 2020-07-06

## 2019-07-13 RX ORDER — ASPIRIN/CALCIUM CARB/MAGNESIUM 324 MG
1 TABLET ORAL
Qty: 0 | Refills: 0 | DISCHARGE

## 2019-07-13 RX ORDER — CLOPIDOGREL BISULFATE 75 MG/1
1 TABLET, FILM COATED ORAL
Qty: 30 | Refills: 11
Start: 2019-07-13 | End: 2020-07-06

## 2019-07-13 RX ORDER — POTASSIUM CHLORIDE 20 MEQ
10 PACKET (EA) ORAL ONCE
Refills: 0 | Status: COMPLETED | OUTPATIENT
Start: 2019-07-13 | End: 2019-07-13

## 2019-07-13 RX ORDER — NIFEDIPINE 30 MG
1 TABLET, EXTENDED RELEASE 24 HR ORAL
Qty: 60 | Refills: 2
Start: 2019-07-13 | End: 2019-10-10

## 2019-07-13 RX ORDER — ATORVASTATIN CALCIUM 80 MG/1
1 TABLET, FILM COATED ORAL
Qty: 30 | Refills: 3
Start: 2019-07-13 | End: 2019-11-09

## 2019-07-13 RX ORDER — NEBIVOLOL HYDROCHLORIDE 5 MG/1
1 TABLET ORAL
Qty: 0 | Refills: 0 | DISCHARGE

## 2019-07-13 RX ORDER — NIFEDIPINE 30 MG
1 TABLET, EXTENDED RELEASE 24 HR ORAL
Qty: 0 | Refills: 0 | DISCHARGE

## 2019-07-13 RX ADMIN — Medication 40 MILLIEQUIVALENT(S): at 09:23

## 2019-07-13 RX ADMIN — Medication 81 MILLIGRAM(S): at 11:22

## 2019-07-13 RX ADMIN — CLOPIDOGREL BISULFATE 75 MILLIGRAM(S): 75 TABLET, FILM COATED ORAL at 11:22

## 2019-07-13 RX ADMIN — Medication 325 MILLIGRAM(S): at 01:00

## 2019-07-13 RX ADMIN — Medication 325 MILLIGRAM(S): at 00:02

## 2019-07-13 RX ADMIN — Medication 100 MILLIEQUIVALENT(S): at 09:23

## 2019-07-13 RX ADMIN — Medication 30 MILLIGRAM(S): at 09:23

## 2019-07-13 RX ADMIN — Medication 75 MICROGRAM(S): at 06:57

## 2019-07-13 RX ADMIN — NEBIVOLOL HYDROCHLORIDE 2.5 MILLIGRAM(S): 5 TABLET ORAL at 06:57

## 2019-07-13 NOTE — DISCHARGE NOTE PROVIDER - HOSPITAL COURSE
85yo male, Fam Hx of MI (father age 60s) with PMHx of HTN, Aortic regurgitation, Hx of palpitations (w/ recent Holter monitor revealing sinus bradycardia per sebastian KAUFFMAN note), hypothyroid, TIA 5yrs ago, Current prostate CA (under surveillance) who presented to [facility] ED 7/12 with c/o palpitations and chest pain.  Per patient palpitations began last night around 11pm described a frequent and intermittent heart beats.  He eventually was able to fall asleep but again awoke up the palpitations this morning.  Patient also with c/o mild chest pressure upon standing from bed lasting several minutes and resolved on its own.  Patient also with chronic LE swelling.  He denies any syncope, SOB, orthopnea, PND, fevers, chills.  Patient recently underwent an Echo 7/9/19 revealing normal Right and Left systolic function, EF 65-70%, mild to moderate AR, Ascending aorta 3.7cm.  On arrival to [facility] ED palpitations resolved.  EKG revealing Sinus bradycardia w/ TWI lead II, and minimal ST depression AVF.  VSS.  CXR clear lungs.  Labs significant for elevated Troponin 0.05.  He received ASA 324mg PO x1.  Patient subsequently underwent CT Heart Calcium revealing severe Calcium score at 1861 Agatston units, which is at the 79th percentile, adjusted for age, gender and race.    and Aortic calcifications. He is admitted for cardiac catheterization.     Pt is now s/p cardiac cath on 7/13/19:  CSI/orbital atherectomy/MARCUS to severely calcified mLAD (90%), pLCX aneurysmal 30%,  RCA dominant, pRCA significant aneurysmal changes, pRCA and mRCA 30% stenosis, Right radial access site stable.    Given aneurysmal changes, patient underwent CTA Head/Neck w/ IV contrast and Renal US to assess for aneurysms. Per Dr. Cast, pt can be discharged home prior to results of CTs and Renal US. Pt will follow up with Dr. Verma who will review results with pt.     Pt started on Atorvastatin 80mg QHS and will be d/c home on ASA/Plavix DAPT.     No significant events on telemetry overnight. Repeat EKG without ischemic changes. Patient has been medically cleared for discharge as per Dr. Cast. Patient has been given appropriate discharge instructions including medication regimen, access site management and follow up. Medications that patient needs refills on have been e-prescribed to preferred pharmacy.         Temp 97.1 HR 50 /62 RR 18 SpO2 94% RA    Gen: NAD, A&O x3    Cards: RRR, clear S1 and S2 without murmur    Pulm: CTA B/L without w/r/r    Right Radial Access Site: No hematoma or ooze, peripheral pulses 2+ B/L    Abd: soft, NT    Ext: no LE edema or ulcerations B/L

## 2019-07-13 NOTE — DISCHARGE NOTE PROVIDER - CARE PROVIDER_API CALL
Armando Verma)  Cardiovascular Disease; Internal Medicine  [address] [address]  Phone: [phone]  Fax: [phone]  Follow Up Time:

## 2019-07-13 NOTE — DISCHARGE NOTE PROVIDER - NSDCCPGOAL_GEN_ALL_CORE_FT
Pt states she has been having problems with her right knee for some time now. Pt states yesterday she heard something pop in her knee. Pt has been elevating and icing her knee but the pain is still there.
To get better and follow your care plan as instructed.

## 2019-07-13 NOTE — DISCHARGE NOTE PROVIDER - NSDCCPCAREPLAN_GEN_ALL_CORE_FT
PRINCIPAL DISCHARGE DIAGNOSIS  Diagnosis: CAD (coronary artery disease)  Assessment and Plan of Treatment: You have a diagnosis of coronary artery disease and received a stent to your middle Left Anterior Descending coronary artery. You have been started on Aspirin 81mg daily and Plavix (Clopidogrel) 75mg daily. You MUST continue taking the daily Aspirin and Plavix to ensure your stent does not close. DO NOT STOP THESE MEDICATIONS FOR ANY REASON UNLESS OTHERWISE INDICATED BY YOUR CARDIOLOGIST BECAUSE THIS WILL PUT YOU AT RISK FOR A HEART ATTACK. You should refrain from strenuous activity and heavy lifting for 1 week. Please make a follow up appointment with your cardiologist, Dr. Lunsford within 1 weeks of your discharge. DURING YOUR VISIT WITH DR. LUNSFORD PLEASE MAKE SURE TO REVIEW THE RESULTS OF THE CT SCANS OF YOUR HEAD AND NECK AS WELL AS THE ULTRASOUND OF YOUR KIDNEYS. All of your prescriptions have been sent electronically to your pharmacy.      SECONDARY DISCHARGE DIAGNOSES  Diagnosis: HLD (hyperlipidemia)  Assessment and Plan of Treatment: We have started you on Atorvastatin (Lipitor) 80mg once daily each night to lower your cholesterol and reduce your risk of heart disease progression.    Diagnosis: HTN (hypertension)  Assessment and Plan of Treatment: You have a history of elevated blood pressure and you should continue your blood pressure medications as prescribed.    Diagnosis: Hypothyroid  Assessment and Plan of Treatment: You have a history of hypothyroidism and you should continue your  medications as prescribed.

## 2019-07-13 NOTE — DISCHARGE NOTE NURSING/CASE MANAGEMENT/SOCIAL WORK - NSDCDPATPORTLINK_GEN_ALL_CORE
You can access the [facility] Patient Portal, offered by [facility], by registering with the following website: [email or web address]

## 2019-07-13 NOTE — PROGRESS NOTE ADULT - SUBJECTIVE AND OBJECTIVE BOX
INTERVENTIONAL CARDIOLOGY FOLLOW UP NOTE    -Patient seen and examined this am  -No events overnight  -No complaints this am    T(C): 36.2 (07-13-19 @ 06:04), Max: 36.8 (07-12-19 @ 22:30)  HR: 72 (07-13-19 @ 06:07) (50 - 75)  BP: 132/74 (07-13-19 @ 06:07) (100/53 - 132/74)  RR: 18 (07-13-19 @ 06:07) (18 - 20)  SpO2: 94% (07-13-19 @ 06:07) (94% - 98%)    VASCULAR ACCESS EXAM:     RADIAL:   2+ right/left radial pulse   Normal capillary refill. No evidence of motor or sensory deficits of digits, hand, wrist or forearm.   -Access site clean, non-tender, without ecchymosis or hematoma.    A/P  S/p PCI via radial approach with no evidence of vascular complications post procedure.     -continue with current medications including dual antiplatelet therapy   -discharge instructions as per protocol   -outpatient follow up with primary cardiologist

## 2019-07-23 ENCOUNTER — APPOINTMENT (OUTPATIENT)
Dept: HEART AND VASCULAR | Facility: CLINIC | Age: 84
End: 2019-07-23

## 2019-07-23 ENCOUNTER — APPOINTMENT (OUTPATIENT)
Dept: HEART AND VASCULAR | Facility: CLINIC | Age: 84
End: 2019-07-23
Payer: MEDICARE

## 2019-07-23 VITALS
HEART RATE: 48 BPM | SYSTOLIC BLOOD PRESSURE: 124 MMHG | OXYGEN SATURATION: 98 % | HEIGHT: 67.32 IN | BODY MASS INDEX: 22.8 KG/M2 | TEMPERATURE: 98.6 F | DIASTOLIC BLOOD PRESSURE: 60 MMHG | WEIGHT: 146.98 LBS

## 2019-07-23 DIAGNOSIS — I49.9 CARDIAC ARRHYTHMIA, UNSPECIFIED: ICD-10-CM

## 2019-07-23 PROCEDURE — 99214 OFFICE O/P EST MOD 30 MIN: CPT

## 2019-07-23 PROCEDURE — 93000 ELECTROCARDIOGRAM COMPLETE: CPT

## 2019-07-23 NOTE — HISTORY OF PRESENT ILLNESS
[FreeTextEntry1] : 84 M HTN Hypothyroid TIA Prostate cancer CAD recent hospital admission for irregular heart beat found to have minimally elevated TnI  s/p stent to mid LAD 7/2019 RCA aneurysm normal LVEF  here for follow up feels good no complaints no cp no sob no dizziness no irregular heart beat \par \par ecg sinus bradycardia

## 2019-07-23 NOTE — ASSESSMENT
[FreeTextEntry1] : cad on gdmt continue plavix for six months\par htn controlled\par irregular heart beat holter was normal feels better when he is bradycardic continue bystolic\par fu in one month

## 2019-07-23 NOTE — PHYSICAL EXAM
[General Appearance - Well Developed] : well developed [Normal Appearance] : normal appearance [Well Groomed] : well groomed [General Appearance - Well Nourished] : well nourished [No Deformities] : no deformities [Eyelids - No Xanthelasma] : the eyelids demonstrated no xanthelasmas [Normal Conjunctiva] : the conjunctiva exhibited no abnormalities [General Appearance - In No Acute Distress] : no acute distress [No Oral Pallor] : no oral pallor [Normal Oral Mucosa] : normal oral mucosa [No Oral Cyanosis] : no oral cyanosis [Normal Jugular Venous A Waves Present] : normal jugular venous A waves present [Normal Jugular Venous V Waves Present] : normal jugular venous V waves present [No Jugular Venous Wolfe A Waves] : no jugular venous wolfe A waves [Respiration, Rhythm And Depth] : normal respiratory rhythm and effort [Auscultation Breath Sounds / Voice Sounds] : lungs were clear to auscultation bilaterally [Heart Rate And Rhythm] : heart rate and rhythm were normal [Exaggerated Use Of Accessory Muscles For Inspiration] : no accessory muscle use [Murmurs] : no murmurs present [Abdomen Soft] : soft [Heart Sounds] : normal S1 and S2 [Abdomen Tenderness] : non-tender [Abnormal Walk] : normal gait [Gait - Sufficient For Exercise Testing] : the gait was sufficient for exercise testing [Abdomen Mass (___ Cm)] : no abdominal mass palpated [Petechial Hemorrhages (___cm)] : no petechial hemorrhages [Cyanosis, Localized] : no localized cyanosis [Nail Clubbing] : no clubbing of the fingernails [] : no ischemic changes [Skin Color & Pigmentation] : normal skin color and pigmentation [No Skin Ulcers] : no skin ulcer [No Venous Stasis] : no venous stasis [Skin Lesions] : no skin lesions [No Xanthoma] : no  xanthoma was observed [Oriented To Time, Place, And Person] : oriented to person, place, and time [Affect] : the affect was normal [Mood] : the mood was normal [No Anxiety] : not feeling anxious

## 2019-07-25 DIAGNOSIS — I48.0 PAROXYSMAL ATRIAL FIBRILLATION: ICD-10-CM

## 2019-07-25 DIAGNOSIS — Z88.1 ALLERGY STATUS TO OTHER ANTIBIOTIC AGENTS STATUS: ICD-10-CM

## 2019-07-25 DIAGNOSIS — I25.41 CORONARY ARTERY ANEURYSM: ICD-10-CM

## 2019-07-25 DIAGNOSIS — Z86.73 PERSONAL HISTORY OF TRANSIENT ISCHEMIC ATTACK (TIA), AND CEREBRAL INFARCTION WITHOUT RESIDUAL DEFICITS: ICD-10-CM

## 2019-07-25 DIAGNOSIS — E03.9 HYPOTHYROIDISM, UNSPECIFIED: ICD-10-CM

## 2019-07-25 DIAGNOSIS — I10 ESSENTIAL (PRIMARY) HYPERTENSION: ICD-10-CM

## 2019-07-25 DIAGNOSIS — C61 MALIGNANT NEOPLASM OF PROSTATE: ICD-10-CM

## 2019-07-25 DIAGNOSIS — I25.110 ATHEROSCLEROTIC HEART DISEASE OF NATIVE CORONARY ARTERY WITH UNSTABLE ANGINA PECTORIS: ICD-10-CM

## 2019-07-25 DIAGNOSIS — I35.1 NONRHEUMATIC AORTIC (VALVE) INSUFFICIENCY: ICD-10-CM

## 2019-07-25 DIAGNOSIS — I21.4 NON-ST ELEVATION (NSTEMI) MYOCARDIAL INFARCTION: ICD-10-CM

## 2019-07-25 DIAGNOSIS — Z79.82 LONG TERM (CURRENT) USE OF ASPIRIN: ICD-10-CM

## 2019-07-25 DIAGNOSIS — E78.5 HYPERLIPIDEMIA, UNSPECIFIED: ICD-10-CM

## 2019-07-25 DIAGNOSIS — N40.0 BENIGN PROSTATIC HYPERPLASIA WITHOUT LOWER URINARY TRACT SYMPTOMS: ICD-10-CM

## 2019-07-25 DIAGNOSIS — Z88.0 ALLERGY STATUS TO PENICILLIN: ICD-10-CM

## 2019-08-04 PROCEDURE — 75571 CT HRT W/O DYE W/CA TEST: CPT

## 2019-08-04 PROCEDURE — 99285 EMERGENCY DEPT VISIT HI MDM: CPT

## 2019-08-04 PROCEDURE — 85610 PROTHROMBIN TIME: CPT

## 2019-08-04 PROCEDURE — C1725: CPT

## 2019-08-04 PROCEDURE — 83036 HEMOGLOBIN GLYCOSYLATED A1C: CPT

## 2019-08-04 PROCEDURE — C1874: CPT

## 2019-08-04 PROCEDURE — 92933 PRQ TRLML C ATHRC ST ANGIOP1: CPT

## 2019-08-04 PROCEDURE — 82553 CREATINE MB FRACTION: CPT

## 2019-08-04 PROCEDURE — 93458 L HRT ARTERY/VENTRICLE ANGIO: CPT

## 2019-08-04 PROCEDURE — C1769: CPT

## 2019-08-04 PROCEDURE — 76775 US EXAM ABDO BACK WALL LIM: CPT

## 2019-08-04 PROCEDURE — 85027 COMPLETE CBC AUTOMATED: CPT

## 2019-08-04 PROCEDURE — 84484 ASSAY OF TROPONIN QUANT: CPT

## 2019-08-04 PROCEDURE — 80053 COMPREHEN METABOLIC PANEL: CPT

## 2019-08-04 PROCEDURE — 85730 THROMBOPLASTIN TIME PARTIAL: CPT

## 2019-08-04 PROCEDURE — C1724: CPT

## 2019-08-04 PROCEDURE — C1894: CPT

## 2019-08-04 PROCEDURE — 71046 X-RAY EXAM CHEST 2 VIEWS: CPT

## 2019-08-04 PROCEDURE — C1887: CPT

## 2019-08-04 PROCEDURE — 80048 BASIC METABOLIC PNL TOTAL CA: CPT

## 2019-08-04 PROCEDURE — 36415 COLL VENOUS BLD VENIPUNCTURE: CPT

## 2019-08-04 PROCEDURE — 70498 CT ANGIOGRAPHY NECK: CPT

## 2019-08-04 PROCEDURE — 82550 ASSAY OF CK (CPK): CPT

## 2019-08-04 PROCEDURE — 83735 ASSAY OF MAGNESIUM: CPT

## 2019-08-04 PROCEDURE — 70496 CT ANGIOGRAPHY HEAD: CPT

## 2019-08-04 PROCEDURE — 80061 LIPID PANEL: CPT

## 2019-08-29 ENCOUNTER — APPOINTMENT (OUTPATIENT)
Dept: HEART AND VASCULAR | Facility: CLINIC | Age: 84
End: 2019-08-29
Payer: MEDICARE

## 2019-08-29 VITALS
HEIGHT: 67 IN | SYSTOLIC BLOOD PRESSURE: 127 MMHG | HEART RATE: 58 BPM | OXYGEN SATURATION: 98 % | BODY MASS INDEX: 22.91 KG/M2 | WEIGHT: 146 LBS | DIASTOLIC BLOOD PRESSURE: 73 MMHG | TEMPERATURE: 98 F

## 2019-08-29 PROCEDURE — 99214 OFFICE O/P EST MOD 30 MIN: CPT

## 2019-08-29 RX ORDER — ASPIRIN 81 MG
81 TABLET, DELAYED RELEASE (ENTERIC COATED) ORAL DAILY
Refills: 0 | Status: DISCONTINUED | COMMUNITY
End: 2019-08-29

## 2019-08-29 NOTE — PHYSICAL EXAM
[General Appearance - Well Developed] : well developed [Well Groomed] : well groomed [Normal Appearance] : normal appearance [General Appearance - Well Nourished] : well nourished [No Deformities] : no deformities [Normal Conjunctiva] : the conjunctiva exhibited no abnormalities [General Appearance - In No Acute Distress] : no acute distress [Normal Oral Mucosa] : normal oral mucosa [Eyelids - No Xanthelasma] : the eyelids demonstrated no xanthelasmas [No Oral Pallor] : no oral pallor [No Oral Cyanosis] : no oral cyanosis [Normal Jugular Venous V Waves Present] : normal jugular venous V waves present [Normal Jugular Venous A Waves Present] : normal jugular venous A waves present [No Jugular Venous Wolfe A Waves] : no jugular venous wolfe A waves [Auscultation Breath Sounds / Voice Sounds] : lungs were clear to auscultation bilaterally [Respiration, Rhythm And Depth] : normal respiratory rhythm and effort [Exaggerated Use Of Accessory Muscles For Inspiration] : no accessory muscle use [Heart Rate And Rhythm] : heart rate and rhythm were normal [Heart Sounds] : normal S1 and S2 [Abdomen Soft] : soft [Murmurs] : no murmurs present [Abdomen Tenderness] : non-tender [Abdomen Mass (___ Cm)] : no abdominal mass palpated [Abnormal Walk] : normal gait [Gait - Sufficient For Exercise Testing] : the gait was sufficient for exercise testing [Nail Clubbing] : no clubbing of the fingernails [Cyanosis, Localized] : no localized cyanosis [Petechial Hemorrhages (___cm)] : no petechial hemorrhages [Skin Color & Pigmentation] : normal skin color and pigmentation [] : no rash [No Venous Stasis] : no venous stasis [Skin Lesions] : no skin lesions [No Skin Ulcers] : no skin ulcer [No Xanthoma] : no  xanthoma was observed [Oriented To Time, Place, And Person] : oriented to person, place, and time [Affect] : the affect was normal [No Anxiety] : not feeling anxious [Mood] : the mood was normal

## 2019-08-30 NOTE — ASSESSMENT
[FreeTextEntry1] : cad on gdmt continue plavix for six months stop in january \par htn controlled\par irregular heart beat holter was normal feels better when he is bradycardic continue bystolic\par fu in three months

## 2019-09-26 ENCOUNTER — APPOINTMENT (OUTPATIENT)
Dept: OPHTHALMOLOGY | Facility: CLINIC | Age: 84
End: 2019-09-26
Payer: MEDICARE

## 2019-09-26 ENCOUNTER — NON-APPOINTMENT (OUTPATIENT)
Age: 84
End: 2019-09-26

## 2019-09-26 PROCEDURE — 92012 INTRM OPH EXAM EST PATIENT: CPT

## 2019-10-04 ENCOUNTER — APPOINTMENT (OUTPATIENT)
Dept: HEART AND VASCULAR | Facility: CLINIC | Age: 84
End: 2019-10-04
Payer: MEDICARE

## 2019-10-04 VITALS
HEIGHT: 67 IN | SYSTOLIC BLOOD PRESSURE: 124 MMHG | BODY MASS INDEX: 22.76 KG/M2 | OXYGEN SATURATION: 97 % | DIASTOLIC BLOOD PRESSURE: 60 MMHG | TEMPERATURE: 97.9 F | WEIGHT: 145 LBS | HEART RATE: 57 BPM

## 2019-10-04 PROCEDURE — 99214 OFFICE O/P EST MOD 30 MIN: CPT

## 2019-10-04 NOTE — PHYSICAL EXAM
[General Appearance - Well Developed] : well developed [Normal Appearance] : normal appearance [Well Groomed] : well groomed [General Appearance - Well Nourished] : well nourished [General Appearance - In No Acute Distress] : no acute distress [No Deformities] : no deformities [Normal Conjunctiva] : the conjunctiva exhibited no abnormalities [Eyelids - No Xanthelasma] : the eyelids demonstrated no xanthelasmas [Normal Oral Mucosa] : normal oral mucosa [No Oral Pallor] : no oral pallor [No Oral Cyanosis] : no oral cyanosis [Normal Jugular Venous A Waves Present] : normal jugular venous A waves present [Normal Jugular Venous V Waves Present] : normal jugular venous V waves present [No Jugular Venous Wolfe A Waves] : no jugular venous wolfe A waves [Respiration, Rhythm And Depth] : normal respiratory rhythm and effort [Auscultation Breath Sounds / Voice Sounds] : lungs were clear to auscultation bilaterally [Exaggerated Use Of Accessory Muscles For Inspiration] : no accessory muscle use [Heart Rate And Rhythm] : heart rate and rhythm were normal [Heart Sounds] : normal S1 and S2 [Murmurs] : no murmurs present [Abdomen Soft] : soft [Abdomen Tenderness] : non-tender [Abdomen Mass (___ Cm)] : no abdominal mass palpated [Abnormal Walk] : normal gait [Gait - Sufficient For Exercise Testing] : the gait was sufficient for exercise testing [Nail Clubbing] : no clubbing of the fingernails [Cyanosis, Localized] : no localized cyanosis [Petechial Hemorrhages (___cm)] : no petechial hemorrhages [Skin Color & Pigmentation] : normal skin color and pigmentation [] : no rash [No Venous Stasis] : no venous stasis [Skin Lesions] : no skin lesions [No Xanthoma] : no  xanthoma was observed [No Skin Ulcers] : no skin ulcer [Oriented To Time, Place, And Person] : oriented to person, place, and time [Mood] : the mood was normal [Affect] : the affect was normal [No Anxiety] : not feeling anxious

## 2019-10-04 NOTE — ASSESSMENT
[FreeTextEntry1] : cad on gdmt continue plavix for six months stop in january \par would not stop plavix for dental work \par htn controlled\par irregular heart beat holter was normal feels better when he is bradycardic continue bystolic\par fu in four months

## 2019-10-04 NOTE — HISTORY OF PRESENT ILLNESS
[FreeTextEntry1] : 84 M HTN Hypothyroid TIA Prostate cancer CAD recent hospital admission for irregular heart beat found to have minimally elevated TnI  s/p stent to mid LAD 7/2019 RCA aneurysm normal LVEF  here for follow up feels good no complaints no cp no sob no dizziness no irregular heart beat wants to have extensive dental work \par \par ecg sinus bradycardia

## 2019-11-06 ENCOUNTER — APPOINTMENT (OUTPATIENT)
Dept: OPHTHALMOLOGY | Facility: AMBULATORY SURGERY CENTER | Age: 84
End: 2019-11-06

## 2019-11-07 ENCOUNTER — APPOINTMENT (OUTPATIENT)
Dept: OPHTHALMOLOGY | Facility: CLINIC | Age: 84
End: 2019-11-07

## 2020-01-28 ENCOUNTER — APPOINTMENT (OUTPATIENT)
Dept: HEART AND VASCULAR | Facility: CLINIC | Age: 85
End: 2020-01-28
Payer: MEDICARE

## 2020-01-28 VITALS
HEART RATE: 50 BPM | BODY MASS INDEX: 22.76 KG/M2 | SYSTOLIC BLOOD PRESSURE: 140 MMHG | OXYGEN SATURATION: 98 % | HEIGHT: 67 IN | DIASTOLIC BLOOD PRESSURE: 65 MMHG | TEMPERATURE: 96.7 F | WEIGHT: 145 LBS

## 2020-01-28 DIAGNOSIS — I35.1 NONRHEUMATIC AORTIC (VALVE) INSUFFICIENCY: ICD-10-CM

## 2020-01-28 PROCEDURE — 99214 OFFICE O/P EST MOD 30 MIN: CPT

## 2020-01-28 PROCEDURE — 93000 ELECTROCARDIOGRAM COMPLETE: CPT

## 2020-01-28 NOTE — ASSESSMENT
[FreeTextEntry1] : cad on gdmt continue plavix for six months \par htn controlled\par irregular heart beat holter was normal feels better when he is bradycardic continue bystolic\par fu in six months

## 2020-01-28 NOTE — PHYSICAL EXAM
[Normal Appearance] : normal appearance [General Appearance - Well Developed] : well developed [Well Groomed] : well groomed [No Deformities] : no deformities [General Appearance - Well Nourished] : well nourished [Normal Conjunctiva] : the conjunctiva exhibited no abnormalities [General Appearance - In No Acute Distress] : no acute distress [Normal Oral Mucosa] : normal oral mucosa [Eyelids - No Xanthelasma] : the eyelids demonstrated no xanthelasmas [No Oral Cyanosis] : no oral cyanosis [Normal Jugular Venous A Waves Present] : normal jugular venous A waves present [No Oral Pallor] : no oral pallor [No Jugular Venous Wolfe A Waves] : no jugular venous wolfe A waves [Normal Jugular Venous V Waves Present] : normal jugular venous V waves present [Respiration, Rhythm And Depth] : normal respiratory rhythm and effort [Exaggerated Use Of Accessory Muscles For Inspiration] : no accessory muscle use [Auscultation Breath Sounds / Voice Sounds] : lungs were clear to auscultation bilaterally [Heart Rate And Rhythm] : heart rate and rhythm were normal [Heart Sounds] : normal S1 and S2 [Abdomen Soft] : soft [Murmurs] : no murmurs present [Abdomen Tenderness] : non-tender [Abdomen Mass (___ Cm)] : no abdominal mass palpated [Gait - Sufficient For Exercise Testing] : the gait was sufficient for exercise testing [Abnormal Walk] : normal gait [Nail Clubbing] : no clubbing of the fingernails [Cyanosis, Localized] : no localized cyanosis [Petechial Hemorrhages (___cm)] : no petechial hemorrhages [Skin Color & Pigmentation] : normal skin color and pigmentation [] : no rash [No Venous Stasis] : no venous stasis [Skin Lesions] : no skin lesions [No Skin Ulcers] : no skin ulcer [No Xanthoma] : no  xanthoma was observed [Oriented To Time, Place, And Person] : oriented to person, place, and time [Affect] : the affect was normal [Mood] : the mood was normal [No Anxiety] : not feeling anxious

## 2020-02-11 ENCOUNTER — TRANSCRIPTION ENCOUNTER (OUTPATIENT)
Age: 85
End: 2020-02-11

## 2020-06-10 ENCOUNTER — NON-APPOINTMENT (OUTPATIENT)
Age: 85
End: 2020-06-10

## 2020-06-10 ENCOUNTER — APPOINTMENT (OUTPATIENT)
Dept: HEART AND VASCULAR | Facility: CLINIC | Age: 85
End: 2020-06-10
Payer: MEDICARE

## 2020-06-10 VITALS
HEART RATE: 62 BPM | TEMPERATURE: 99.8 F | HEIGHT: 67 IN | DIASTOLIC BLOOD PRESSURE: 68 MMHG | BODY MASS INDEX: 24.17 KG/M2 | OXYGEN SATURATION: 97 % | SYSTOLIC BLOOD PRESSURE: 120 MMHG | WEIGHT: 153.99 LBS

## 2020-06-10 PROCEDURE — 93000 ELECTROCARDIOGRAM COMPLETE: CPT

## 2020-06-10 PROCEDURE — 36415 COLL VENOUS BLD VENIPUNCTURE: CPT

## 2020-06-10 PROCEDURE — 99214 OFFICE O/P EST MOD 30 MIN: CPT

## 2020-06-10 NOTE — PHYSICAL EXAM
[General Appearance - Well Developed] : well developed [Normal Appearance] : normal appearance [Well Groomed] : well groomed [No Deformities] : no deformities [General Appearance - Well Nourished] : well nourished [General Appearance - In No Acute Distress] : no acute distress [Normal Conjunctiva] : the conjunctiva exhibited no abnormalities [Normal Oral Mucosa] : normal oral mucosa [Eyelids - No Xanthelasma] : the eyelids demonstrated no xanthelasmas [No Oral Pallor] : no oral pallor [No Oral Cyanosis] : no oral cyanosis [Normal Jugular Venous A Waves Present] : normal jugular venous A waves present [Normal Jugular Venous V Waves Present] : normal jugular venous V waves present [No Jugular Venous Wolfe A Waves] : no jugular venous wolfe A waves [Respiration, Rhythm And Depth] : normal respiratory rhythm and effort [Exaggerated Use Of Accessory Muscles For Inspiration] : no accessory muscle use [Auscultation Breath Sounds / Voice Sounds] : lungs were clear to auscultation bilaterally [Heart Rate And Rhythm] : heart rate and rhythm were normal [Heart Sounds] : normal S1 and S2 [Murmurs] : no murmurs present [Abdomen Soft] : soft [Abdomen Tenderness] : non-tender [Abdomen Mass (___ Cm)] : no abdominal mass palpated [Abnormal Walk] : normal gait [Gait - Sufficient For Exercise Testing] : the gait was sufficient for exercise testing [Cyanosis, Localized] : no localized cyanosis [Nail Clubbing] : no clubbing of the fingernails [Petechial Hemorrhages (___cm)] : no petechial hemorrhages [Skin Color & Pigmentation] : normal skin color and pigmentation [] : no rash [No Venous Stasis] : no venous stasis [Skin Lesions] : no skin lesions [No Skin Ulcers] : no skin ulcer [No Xanthoma] : no  xanthoma was observed [Oriented To Time, Place, And Person] : oriented to person, place, and time [Affect] : the affect was normal [Mood] : the mood was normal [No Anxiety] : not feeling anxious

## 2020-06-11 LAB
ALBUMIN SERPL ELPH-MCNC: 4.6 G/DL
ALP BLD-CCNC: 68 U/L
ALT SERPL-CCNC: 20 U/L
ANION GAP SERPL CALC-SCNC: 12 MMOL/L
APTT BLD: 45.8 SEC
AST SERPL-CCNC: 28 U/L
BASOPHILS # BLD AUTO: 0.03 K/UL
BASOPHILS NFR BLD AUTO: 0.5 %
BILIRUB SERPL-MCNC: 0.8 MG/DL
BUN SERPL-MCNC: 15 MG/DL
CALCIUM SERPL-MCNC: 9.7 MG/DL
CHLORIDE SERPL-SCNC: 106 MMOL/L
CHOLEST SERPL-MCNC: 125 MG/DL
CHOLEST/HDLC SERPL: 3 RATIO
CO2 SERPL-SCNC: 26 MMOL/L
CREAT SERPL-MCNC: 1.04 MG/DL
EOSINOPHIL # BLD AUTO: 0.13 K/UL
EOSINOPHIL NFR BLD AUTO: 2.1 %
ESTIMATED AVERAGE GLUCOSE: 97 MG/DL
GLUCOSE SERPL-MCNC: 91 MG/DL
HBA1C MFR BLD HPLC: 5 %
HCT VFR BLD CALC: 47.8 %
HDLC SERPL-MCNC: 42 MG/DL
HGB BLD-MCNC: 15.1 G/DL
IMM GRANULOCYTES NFR BLD AUTO: 0.5 %
LDLC SERPL CALC-MCNC: 53 MG/DL
LYMPHOCYTES # BLD AUTO: 0.59 K/UL
LYMPHOCYTES NFR BLD AUTO: 9.4 %
MAN DIFF?: NORMAL
MCHC RBC-ENTMCNC: 31.1 PG
MCHC RBC-ENTMCNC: 31.6 GM/DL
MCV RBC AUTO: 98.4 FL
MONOCYTES # BLD AUTO: 0.54 K/UL
MONOCYTES NFR BLD AUTO: 8.6 %
NEUTROPHILS # BLD AUTO: 4.95 K/UL
NEUTROPHILS NFR BLD AUTO: 78.9 %
NT-PROBNP SERPL-MCNC: 171 PG/ML
PLATELET # BLD AUTO: 175 K/UL
POTASSIUM SERPL-SCNC: 4.5 MMOL/L
PROT SERPL-MCNC: 7.2 G/DL
RBC # BLD: 4.86 M/UL
RBC # FLD: 13.6 %
SARS-COV-2 IGG SERPL IA-ACNC: <0.1 INDEX
SARS-COV-2 IGG SERPL QL IA: NEGATIVE
SODIUM SERPL-SCNC: 144 MMOL/L
T4 FREE SERPL-MCNC: 1.1 NG/DL
TRIGL SERPL-MCNC: 149 MG/DL
TSH SERPL-ACNC: 1.39 UIU/ML
WBC # FLD AUTO: 6.27 K/UL

## 2020-06-11 NOTE — HISTORY OF PRESENT ILLNESS
[FreeTextEntry1] : 85 M HTN Hypothyroid TIA Prostate cancer CAD recent hospital admission for irregular heart beat found to have minimally elevated TnI  s/p stent to mid LAD 7/2019 RCA aneurysm normal LVEF Atrial fibrillation on Eliquis here for follow up feels good no complaints no cp no sob no dizziness no irregular heart beat just feels his heart rate is faster in the morning gets better as the day progresses no exertional symptoms\par \par ecg sinus bradycardia

## 2020-06-11 NOTE — ASSESSMENT
[FreeTextEntry1] : cad on gdmt stop aspirin\par htn controlled\par afib on eliquis\par fu in three months\par if he is dizzy will do monitor he is bradycardiac when not in afib has not symptoms of AF since march

## 2020-07-06 RX ORDER — KRILL/OM-3/DHA/EPA/PHOSPHO/AST 1000-230MG
81 CAPSULE ORAL DAILY
Qty: 90 | Refills: 3 | Status: DISCONTINUED | COMMUNITY
Start: 2019-08-29 | End: 2020-07-06

## 2020-07-13 ENCOUNTER — RX RENEWAL (OUTPATIENT)
Age: 85
End: 2020-07-13

## 2020-07-27 PROBLEM — Z80.42 FHX: PROSTATE CANCER: Status: ACTIVE | Noted: 2020-07-27

## 2020-07-28 ENCOUNTER — APPOINTMENT (OUTPATIENT)
Dept: UROLOGY | Facility: CLINIC | Age: 85
End: 2020-07-28
Payer: MEDICARE

## 2020-07-28 DIAGNOSIS — Z80.42 FAMILY HISTORY OF MALIGNANT NEOPLASM OF PROSTATE: ICD-10-CM

## 2020-08-03 ENCOUNTER — LABORATORY RESULT (OUTPATIENT)
Age: 85
End: 2020-08-03

## 2020-08-03 ENCOUNTER — APPOINTMENT (OUTPATIENT)
Dept: UROLOGY | Facility: CLINIC | Age: 85
End: 2020-08-03
Payer: MEDICARE

## 2020-08-03 VITALS
HEART RATE: 72 BPM | DIASTOLIC BLOOD PRESSURE: 73 MMHG | SYSTOLIC BLOOD PRESSURE: 135 MMHG | HEIGHT: 67 IN | TEMPERATURE: 98 F

## 2020-08-03 PROCEDURE — 99214 OFFICE O/P EST MOD 30 MIN: CPT

## 2020-08-03 PROCEDURE — 99204 OFFICE O/P NEW MOD 45 MIN: CPT

## 2020-08-03 NOTE — ASSESSMENT
[FreeTextEntry1] : In summary, the patient is 85 year old male, here for PCa follow up, GG1, soft base nodule on PORTILLO (appears to correlated with BPH nodule on left base) - a biopsy was offered but probability of CS disease is low, patient deferred rebiopsy.\par \par 1. PSA / prostate cancer on AS with Dr. Garcia (MRI was low risk)\par     - MRI repeat prior to embolization.\par 2. BPH symptoms has gotten worse - alpha blockers ( 7 years)\par 3. He has high volume voids at night - more urine at night than the day\par     - he does have pitting edema + 2 - which could also be the cause of increased urination at night with legs on a pillow.  He will speak with his Primary doc to do a sleep study and his cardiologist dr. ashby to manage fluid levels.\par 4. he is on eliquist - TR approach for a PAE is possible once we assess other medical issues.\par 5. PVR and Flow next visit\par \par Thank you very much for allowing me to assist in the care of this patient. Should you have any additional questions or concerns please do not hesitate to contact me.\par \par \par Sincerely,\par \par \par Sin Kulkarni D.O.\par  of Urology and Radiology\par  of Urology at [facility]\par System Director for Prostate Cancer\par [address] [place] [address], [address]\par Phone: [phone]\par

## 2020-08-03 NOTE — HISTORY OF PRESENT ILLNESS
[FreeTextEntry1] : Dear Silvio Garcia and Dr. 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  The patient is on eliquist for history of Afib.  \par \par With regards has gotten worse - nocturia x 5 - patient does not snore and has never been assessed for sleep apnea .  He makes more urine at night than the day.  He has been taking alfulozosin for the last 7 years.  He has a slow flow.  \par \par At that time he was interested in nanoparticle. His most recent PSA at that time was 6.4 in 02/2019. His PSA was 7.1 in 07/2016. Patient has had a rising PSA since 2015. He has had 1 prostate biopsy on 07/16/2016. It was positive in the right lobe with a GG1, 10%, 1.5 mm - This may correlate with small area (< 0.2 cc) on MRI appreciated on slice #20. Patient has not had an MRI done in the past. Patient is on Uroxatral for many years but not on Finasteride. He is not sexually active. Brother had prostate cancer.\par \par 07/28/2020 \par IPSS 24 QOL 4\par GLADIS 1 - will need discussion regarding ED treatments in the future.\par \par The patient denies fevers, chills, nausea and or vomiting and no unexplained weight loss.\par \par All pertinent laboratory, films and physician notes were reviewed.  Questionnaire results were discussed with patient.\par \par I discussed with the patient and reviewed the risks and benefits and alternatives to prostate artery embolization.\par \par We discussed the early result and success of the procedure in general as well as my personal results. Specifically, reviewed risk related to nontarget embolization most commonly involving the bladder and/or rectum. We also spoke about the potential for post procedure obstruction required castillo catheter drainage. We reviewed some technical aspects up of the procedure including embolic material utilizing the importance of cone beam CT prior to embolization. The patient wishes procedure scheduling a procedure.\par \par Specific risks of the embolization procedure which were discussed with the patient including infection, bleeding, dysuria, hematuria, hematospermia, non-target embolization which would result in damage to bladder wall leading to ulceration/ischemia, rectal wall injury, and injury to penis and ejaculatory mechanism.  These risks are considered to be low.  Pelvic pain and burning is not uncommon and considered to be mild and transient. The patient understands that a Castillo catheter may be inserted during the procedure to help guide embolization and will be removed at the end of the procedure. A small percentage of patients will have some degree of urinary retention after embolization and will require a catheter to be left in. He understands this.  We also discussed that long-term results of prostate embolization are unknown but response rates in reduction of his IPSS score are 80-85% based on current literature and state of the art techniques. \par \par It was explained to the patient that approximate 5% patient experienced some degree bruising following femoral or radial artery catheterization.  The hematoma is benign and resolves spontaneously under typical circumstances. The specific benefits and risks of transradial (TR) access versus transfemoral (TF) access were discussed in detail with the patient. This includes decreased risk of bleeding, immediate ambulation and faster discharge time. Potential increased and extremely remote risk of cerebral emboli was discussed.  The patient was given information packet with further details. The patient does indicate preference for transradial access during the procedure.

## 2020-08-03 NOTE — PHYSICAL EXAM
[General Appearance - Well Nourished] : well nourished [General Appearance - Well Developed] : well developed [Abdomen Soft] : soft [Normal Station and Gait] : the gait and station were normal for the patient's age [] : no rash [Oriented To Time, Place, And Person] : oriented to person, place, and time [No Focal Deficits] : no focal deficits [Not Anxious] : not anxious [Urethral Meatus] : meatus normal [Testes Mass (___cm)] : there were no testicular masses [Testes Tenderness] : no tenderness of the testes [No Prostate Nodules] : no prostate nodules [Prostate Size ___ gm] : prostate size [unfilled] gm [FreeTextEntry1] : bilateral lower extremity edema.

## 2020-09-09 ENCOUNTER — APPOINTMENT (OUTPATIENT)
Dept: HEART AND VASCULAR | Facility: CLINIC | Age: 85
End: 2020-09-09
Payer: MEDICARE

## 2020-09-09 ENCOUNTER — NON-APPOINTMENT (OUTPATIENT)
Age: 85
End: 2020-09-09

## 2020-09-09 VITALS
SYSTOLIC BLOOD PRESSURE: 140 MMHG | WEIGHT: 153.99 LBS | OXYGEN SATURATION: 97 % | HEIGHT: 67 IN | BODY MASS INDEX: 24.17 KG/M2 | HEART RATE: 63 BPM | DIASTOLIC BLOOD PRESSURE: 70 MMHG | TEMPERATURE: 97.7 F

## 2020-09-09 PROCEDURE — 99214 OFFICE O/P EST MOD 30 MIN: CPT

## 2020-09-09 PROCEDURE — 93000 ELECTROCARDIOGRAM COMPLETE: CPT

## 2020-09-09 RX ORDER — CLOPIDOGREL BISULFATE 75 MG/1
75 TABLET, FILM COATED ORAL DAILY
Qty: 90 | Refills: 3 | Status: DISCONTINUED | COMMUNITY
End: 2020-09-09

## 2020-09-09 RX ORDER — POTASSIUM CITRATE 15 MEQ/1
TABLET, EXTENDED RELEASE ORAL
Refills: 0 | Status: DISCONTINUED | COMMUNITY
End: 2020-09-09

## 2020-09-09 RX ORDER — NIFEDIPINE 30 MG/1
30 TABLET, FILM COATED, EXTENDED RELEASE ORAL
Qty: 90 | Refills: 3 | Status: DISCONTINUED | COMMUNITY
End: 2020-09-09

## 2020-09-09 NOTE — PHYSICAL EXAM
[General Appearance - Well Developed] : well developed [Well Groomed] : well groomed [General Appearance - Well Nourished] : well nourished [Normal Appearance] : normal appearance [General Appearance - In No Acute Distress] : no acute distress [No Deformities] : no deformities [Normal Oral Mucosa] : normal oral mucosa [Normal Conjunctiva] : the conjunctiva exhibited no abnormalities [Eyelids - No Xanthelasma] : the eyelids demonstrated no xanthelasmas [No Oral Pallor] : no oral pallor [No Oral Cyanosis] : no oral cyanosis [Normal Jugular Venous A Waves Present] : normal jugular venous A waves present [Normal Jugular Venous V Waves Present] : normal jugular venous V waves present [No Jugular Venous Wolfe A Waves] : no jugular venous wolfe A waves [Respiration, Rhythm And Depth] : normal respiratory rhythm and effort [Exaggerated Use Of Accessory Muscles For Inspiration] : no accessory muscle use [Heart Rate And Rhythm] : heart rate and rhythm were normal [Auscultation Breath Sounds / Voice Sounds] : lungs were clear to auscultation bilaterally [Murmurs] : no murmurs present [Abdomen Soft] : soft [Heart Sounds] : normal S1 and S2 [Abdomen Mass (___ Cm)] : no abdominal mass palpated [Abdomen Tenderness] : non-tender [Abnormal Walk] : normal gait [Gait - Sufficient For Exercise Testing] : the gait was sufficient for exercise testing [Petechial Hemorrhages (___cm)] : no petechial hemorrhages [Cyanosis, Localized] : no localized cyanosis [Nail Clubbing] : no clubbing of the fingernails [Skin Color & Pigmentation] : normal skin color and pigmentation [Skin Lesions] : no skin lesions [No Venous Stasis] : no venous stasis [] : no rash [No Xanthoma] : no  xanthoma was observed [No Skin Ulcers] : no skin ulcer [Oriented To Time, Place, And Person] : oriented to person, place, and time [Affect] : the affect was normal [Mood] : the mood was normal [No Anxiety] : not feeling anxious

## 2020-09-09 NOTE — ASSESSMENT
[FreeTextEntry1] : cad on gdmt stop aspirin\par htn controlled\par afib on eliquis bradycardic not on avn may need PPM to allow for BB use will do monitor today \par fu in three weeks

## 2020-09-09 NOTE — HISTORY OF PRESENT ILLNESS
[FreeTextEntry1] : 85 M HTN Hypothyroid TIA Prostate cancer CAD recent hospital admission for irregular heart beat found to have minimally elevated TnI  s/p stent to mid LAD 7/2019 RCA aneurysm normal LVEF Atrial fibrillation on Eliquis here for follow up feels good no complaints no cp no sob no dizziness no irregular heart beat just feels his heart rate is faster in the morning gets better as the day progresses no exertional symptoms no palpitations \par \par ecg sinus bradycardia

## 2020-10-06 ENCOUNTER — APPOINTMENT (OUTPATIENT)
Dept: UROLOGY | Facility: CLINIC | Age: 85
End: 2020-10-06
Payer: MEDICARE

## 2020-10-07 ENCOUNTER — APPOINTMENT (OUTPATIENT)
Dept: UROLOGY | Facility: CLINIC | Age: 85
End: 2020-10-07
Payer: MEDICARE

## 2020-10-07 VITALS
DIASTOLIC BLOOD PRESSURE: 75 MMHG | HEIGHT: 67 IN | TEMPERATURE: 97.9 F | RESPIRATION RATE: 16 BRPM | BODY MASS INDEX: 24.01 KG/M2 | HEART RATE: 77 BPM | OXYGEN SATURATION: 99 % | WEIGHT: 153 LBS | SYSTOLIC BLOOD PRESSURE: 146 MMHG

## 2020-10-07 PROCEDURE — 99214 OFFICE O/P EST MOD 30 MIN: CPT

## 2020-10-07 NOTE — HISTORY OF PRESENT ILLNESS
[FreeTextEntry1] : Dear Silvio Garcia and Dr. 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 The patient dx with MERVIN.  \par \par He was seen 08/03/20, and prior at [facility] on 05/10/[facility], when he was advised to repeat PSA in 6 months, consider PAE if symptoms persist, nocturia, PVR, Flow.  The patient is on Eliquist for history of Afib.  \par \par Had evaluation for MERVIN, has severe Apnea, not using machine at this point. \par MRI ([facility]) read 09/27/20. 58 cc, PSAD 0.12, PIRADS 3 lesion measuring 1.1 cm in the Right posteromedial midgland PZ. No LAD. No EPE. No Bony Lesions. The images have been reviewed and clinical implications discussed with the patient.\par \par With regards to LUTS, it has gotten worse - nocturia x 5 - patient does not snore and has never been assessed for sleep apnea .  He makes more urine at night than the day.  He has been taking Alfuzosin for the last 7 years.  He has a slow flow.  \par \par At that time he was interested in nanoparticle. His most recent PSA at that time was 6.4 in 02/2019. His PSA was 7.1 in 07/2016. Patient has had a rising PSA since 2015. He has had 1 prostate biopsy on 07/16/2016. It was positive in the right lobe with a GG1, 10%, 1.5 mm - This may correlate with small area (< 0.2 cc) on MRI appreciated on slice #20. Patient has not had an MRI done in the past. Patient is on Uroxatral for many years but not on Finasteride. He is not sexually active. Brother had prostate cancer.\par \par MRI dated 9/27/20 - 80 cc prostate right mid pzpm with no signs of EPE or LAD or bony disease.  We have reviewd the films together\par \par 10/06/2020 \par IPSS 30  QOL 5\par GLADIS 1\par \par The patient denies fevers, chills, nausea and or vomiting and no unexplained weight loss.\par \par All pertinent laboratory, films and physician notes were reviewed.  Questionnaire results were discussed with patient.\par \par I spent 31 minutes of non-face to face time reviewing the patient's records, chart, uploading processing MR images, transferring MR images from PACS to an independent workstation, reviewing images on independent workstation, speaking with their physician and planning their possible biopsy and preparation of an upcoming visit for 10/07/2020 .  The imaging and planning was highly complex and took this entire time. \par \par The patient is on AS the MRI was done to stage dz as well as assess anatomy prior to to intervention for BPH (nocturia), but the nocturia is due to MERVIN and he is dx with that condition.

## 2020-10-07 NOTE — ASSESSMENT
[FreeTextEntry1] : In summary, the patient is 85 year old male, here for PCa follow up, GG1, soft base nodule on PORTILLO (appears to correlated with BPH nodule on left base) - a biopsy was offered but probability of CS disease is low, patient deferred rebiopsy.\par \par 1. PSA / prostate cancer on AS with Dr. Garcia (MRI was low risk)\par     - MRI repeat prior to embolization.\par 2. BPH symptoms has gotten worse - alpha blockers ( 7 years)\par 3. He has high volume voids at night - more urine at night than the day\par     - he does have pitting edema + 2 - which could also be the cause of increased urination at night with legs on a pillow.  He will speak with his Primary doc to do a sleep study and his cardiologist dr. ashby to manage fluid levels.\par 4. he is on eliquist - TR approach for a PAE is possible once we assess other medical issues.\par 5. PVR and Flow next visit\par \par Thank you very much for allowing me to assist in the care of this patient. Should you have any additional questions or concerns please do not hesitate to contact me.\par \par \par Sincerely,\par \par \par Sin Kulkarni D.O.\par  of Urology and Radiology\par  of Urology at [facility]\par System Director for Prostate Cancer\par [address] [place] [address], [address]\par Phone: [phone]\par

## 2020-11-05 ENCOUNTER — NON-APPOINTMENT (OUTPATIENT)
Age: 85
End: 2020-11-05

## 2020-11-05 ENCOUNTER — APPOINTMENT (OUTPATIENT)
Dept: HEART AND VASCULAR | Facility: CLINIC | Age: 85
End: 2020-11-05
Payer: MEDICARE

## 2020-11-05 VITALS
BODY MASS INDEX: 23.7 KG/M2 | DIASTOLIC BLOOD PRESSURE: 68 MMHG | SYSTOLIC BLOOD PRESSURE: 116 MMHG | WEIGHT: 150.99 LBS | HEART RATE: 85 BPM | TEMPERATURE: 98.6 F | HEIGHT: 67 IN | OXYGEN SATURATION: 97 %

## 2020-11-05 PROCEDURE — 93000 ELECTROCARDIOGRAM COMPLETE: CPT

## 2020-11-05 PROCEDURE — 99214 OFFICE O/P EST MOD 30 MIN: CPT

## 2020-11-05 NOTE — HISTORY OF PRESENT ILLNESS
[FreeTextEntry1] : 85 M HTN Hypothyroid TIA Prostate cancer CAD  s/p stent to mid LAD 7/2019 RCA aneurysm normal LVEF Atrial fibrillation on Eliquis MERVIN not yet on CPAP here for follow up episode of palpitations with dizziness this AM now resolved\par \par ecg NSR

## 2020-11-05 NOTE — ASSESSMENT
[FreeTextEntry1] : cad on gdmt \par htn controlled\par afib on eliquis bradycardic in the past to the 40's will need PPM to allow use for AVN\par discussed with EPS will arrange he will see EPS next wednesday and plan for PPM next friday \par he needs to call if he has any more symptoms of dizziness and presyncope \par

## 2020-11-11 ENCOUNTER — NON-APPOINTMENT (OUTPATIENT)
Age: 85
End: 2020-11-11

## 2020-11-11 ENCOUNTER — APPOINTMENT (OUTPATIENT)
Dept: HEART AND VASCULAR | Facility: CLINIC | Age: 85
End: 2020-11-11
Payer: MEDICARE

## 2020-11-11 VITALS
HEART RATE: 57 BPM | HEIGHT: 67 IN | BODY MASS INDEX: 23.54 KG/M2 | SYSTOLIC BLOOD PRESSURE: 151 MMHG | DIASTOLIC BLOOD PRESSURE: 69 MMHG | WEIGHT: 150 LBS

## 2020-11-11 DIAGNOSIS — Z78.9 OTHER SPECIFIED HEALTH STATUS: ICD-10-CM

## 2020-11-11 PROCEDURE — 99205 OFFICE O/P NEW HI 60 MIN: CPT | Mod: 25

## 2020-11-11 PROCEDURE — 93000 ELECTROCARDIOGRAM COMPLETE: CPT

## 2020-11-11 RX ORDER — ALFUZOSIN HYDROCHLORIDE 10 MG/1
10 TABLET, EXTENDED RELEASE ORAL DAILY
Qty: 90 | Refills: 3 | Status: DISCONTINUED | COMMUNITY
End: 2020-11-11

## 2020-11-11 NOTE — DISCUSSION/SUMMARY
[FreeTextEntry1] : 85 year old male with CAD s/p stent, HLD, HTN, MERVIN (not on CPAP), history of prostate cancer, TIA, hypothyroidism and paroxysmal atrial fibrillation with tachy-ondina who presents to discuss pacemaker implant. He has paroxysmal atrial fibrillation with documented rapid rates and symptoms of palpitations, as well as sinus bradycardia to the 40s with lightheadedness.  Use of AV helder agents is limited secondary to bradycardia and he presents to discuss placement of a pacemaker which I am in agreement with.  We discussed the procedure in detail including risks, benefits, alternatives and pre procedure instructions.  He would like to proceed and we will arrange this in the next week.  He knows to call with any questions or concerns. \par

## 2020-11-11 NOTE — PHYSICAL EXAM
[General Appearance - Well Developed] : well developed [Normal Appearance] : normal appearance [Well Groomed] : well groomed [General Appearance - Well Nourished] : well nourished [No Deformities] : no deformities [General Appearance - In No Acute Distress] : no acute distress [Normal Conjunctiva] : the conjunctiva exhibited no abnormalities [Normal Oral Mucosa] : normal oral mucosa [Normal Jugular Venous V Waves Present] : normal jugular venous V waves present [5th Left ICS - MCL] : palpated at the 5th LICS in the midclavicular line [Normal] : normal [No Precordial Heave] : no precordial heave was noted [Apical Thrill] : no thrill palpable at the apex [Normal Rate] : normal [Rhythm Regular] : regular [Normal S1] : normal S1 [Normal S2] : normal S2 [No Pitting Edema] : no pitting edema present [] : no respiratory distress [Respiration, Rhythm And Depth] : normal respiratory rhythm and effort [Exaggerated Use Of Accessory Muscles For Inspiration] : no accessory muscle use [Auscultation Breath Sounds / Voice Sounds] : lungs were clear to auscultation bilaterally [Abnormal Walk] : normal gait [Cyanosis, Localized] : no localized cyanosis [Skin Color & Pigmentation] : normal skin color and pigmentation [Oriented To Time, Place, And Person] : oriented to person, place, and time [Impaired Insight] : insight and judgment were intact [Affect] : the affect was normal [Mood] : the mood was normal [No Anxiety] : not feeling anxious

## 2020-11-11 NOTE — HISTORY OF PRESENT ILLNESS
[FreeTextEntry1] : 85 year old male with CAD s/p stent, HLD, HTN, MERVIN (not on CPAP), history of prostate cancer, TIA, hypothyroidism and paroxysmal atrial fibrillation with tachy-ondina who presents to discuss pacemaker implant. \par \par He states he has a long standing history of atrial fibrillation but it has always been paroxysmal and never needed a cardioversion or been on an antiarrhythmic medication.  His last episode was a few days ago when he experienced palpitations.  No precipitating or alleviating factors.  He has occasional lightheadedness and fatigue.  No chest pain, syncope, BARRETO.  He wore an event monitor recently with heart rates from  with a mean rate of 60.  He follows closely with Dr. Wayne who has recommended a pacemaker so AV helder agents can be used given history of bradycardia and lightheadedness.  \par

## 2020-11-19 LAB — SARS-COV-2 N GENE NPH QL NAA+PROBE: NOT DETECTED

## 2020-11-20 ENCOUNTER — TRANSCRIPTION ENCOUNTER (OUTPATIENT)
Age: 85
End: 2020-11-20

## 2020-11-20 ENCOUNTER — INPATIENT (INPATIENT)
Facility: HOSPITAL | Age: 85
LOS: 0 days | Discharge: HOME | DRG: 244 | End: 2020-11-21
Attending: INTERNAL MEDICINE | Admitting: INTERNAL MEDICINE
Payer: MEDICARE

## 2020-11-20 VITALS — WEIGHT: 154.98 LBS | HEIGHT: 67 IN

## 2020-11-20 DIAGNOSIS — I49.5 SICK SINUS SYNDROME: ICD-10-CM

## 2020-11-20 DIAGNOSIS — Z90.49 ACQUIRED ABSENCE OF OTHER SPECIFIED PARTS OF DIGESTIVE TRACT: Chronic | ICD-10-CM

## 2020-11-20 DIAGNOSIS — E03.9 HYPOTHYROIDISM, UNSPECIFIED: ICD-10-CM

## 2020-11-20 PROCEDURE — 99223 1ST HOSP IP/OBS HIGH 75: CPT | Mod: 57

## 2020-11-20 PROCEDURE — 33208 INSRT HEART PM ATRIAL & VENT: CPT | Mod: KX

## 2020-11-20 RX ORDER — LISINOPRIL 2.5 MG/1
1 TABLET ORAL
Qty: 0 | Refills: 0 | DISCHARGE

## 2020-11-20 RX ORDER — LEVOTHYROXINE SODIUM 125 MCG
75 TABLET ORAL DAILY
Refills: 0 | Status: DISCONTINUED | OUTPATIENT
Start: 2020-11-20 | End: 2020-11-21

## 2020-11-20 RX ORDER — NIFEDIPINE 30 MG
30 TABLET, EXTENDED RELEASE 24 HR ORAL
Qty: 0 | Refills: 0 | DISCHARGE

## 2020-11-20 RX ORDER — NIFEDIPINE 30 MG
30 TABLET, EXTENDED RELEASE 24 HR ORAL DAILY
Refills: 0 | Status: DISCONTINUED | OUTPATIENT
Start: 2020-11-20 | End: 2020-11-21

## 2020-11-20 RX ORDER — LEVOTHYROXINE SODIUM 125 MCG
1 TABLET ORAL
Qty: 0 | Refills: 0 | DISCHARGE

## 2020-11-20 RX ORDER — ATORVASTATIN CALCIUM 80 MG/1
1 TABLET, FILM COATED ORAL
Qty: 0 | Refills: 0 | DISCHARGE

## 2020-11-20 RX ORDER — VANCOMYCIN HCL 1 G
1000 VIAL (EA) INTRAVENOUS ONCE
Refills: 0 | Status: COMPLETED | OUTPATIENT
Start: 2020-11-20 | End: 2020-11-20

## 2020-11-20 RX ORDER — FINASTERIDE 5 MG/1
1 TABLET, FILM COATED ORAL
Qty: 0 | Refills: 0 | DISCHARGE

## 2020-11-20 RX ORDER — LANOLIN ALCOHOL/MO/W.PET/CERES
5 CREAM (GRAM) TOPICAL AT BEDTIME
Refills: 0 | Status: DISCONTINUED | OUTPATIENT
Start: 2020-11-20 | End: 2020-11-21

## 2020-11-20 RX ORDER — ALFUZOSIN HYDROCHLORIDE 10 MG/1
1 TABLET, EXTENDED RELEASE ORAL
Qty: 0 | Refills: 0 | DISCHARGE

## 2020-11-20 RX ORDER — ACETAMINOPHEN 500 MG
650 TABLET ORAL EVERY 4 HOURS
Refills: 0 | Status: DISCONTINUED | OUTPATIENT
Start: 2020-11-20 | End: 2020-11-21

## 2020-11-20 RX ORDER — LISINOPRIL 2.5 MG/1
5 TABLET ORAL DAILY
Refills: 0 | Status: DISCONTINUED | OUTPATIENT
Start: 2020-11-20 | End: 2020-11-21

## 2020-11-20 RX ORDER — ATORVASTATIN CALCIUM 80 MG/1
10 TABLET, FILM COATED ORAL DAILY
Refills: 0 | Status: DISCONTINUED | OUTPATIENT
Start: 2020-11-20 | End: 2020-11-21

## 2020-11-20 RX ADMIN — ATORVASTATIN CALCIUM 10 MILLIGRAM(S): 80 TABLET, FILM COATED ORAL at 12:36

## 2020-11-20 RX ADMIN — Medication 250 MILLIGRAM(S): at 08:45

## 2020-11-20 RX ADMIN — Medication 5 MILLIGRAM(S): at 23:53

## 2020-11-20 RX ADMIN — Medication 650 MILLIGRAM(S): at 17:14

## 2020-11-20 RX ADMIN — LISINOPRIL 5 MILLIGRAM(S): 2.5 TABLET ORAL at 17:14

## 2020-11-20 RX ADMIN — Medication 650 MILLIGRAM(S): at 13:01

## 2020-11-20 RX ADMIN — Medication 650 MILLIGRAM(S): at 17:48

## 2020-11-20 RX ADMIN — Medication 650 MILLIGRAM(S): at 12:37

## 2020-11-20 NOTE — H&P ADULT - HISTORY OF PRESENT ILLNESS
85 year old male with CAD s/p stent, HLD, HTN, MERVIN (not on CPAP), history of prostate cancer, TIA, hypothyroidism and paroxysmal atrial fibrillation with tachy-ondina who presents for   pacemaker implant.   He states he has a long standing history of atrial fibrillation but it has always been paroxysmal and never needed a cardioversion or been on an antiarrhythmic medication.  His last episode was a few days ago when he experienced palpitations.  No precipitating or alleviating factors.  He has occasional lightheadedness and fatigue.  No chest pain, syncope, BARRETO.  He wore an event monitor recently with heart rates from  with a mean rate of 60.  He follows closely with Dr. Wayne who has recommended a pacemaker so AV helder agents can be used given history of bradycardia and lightheadedness.

## 2020-11-20 NOTE — H&P ADULT - ASSESSMENT
85 year old male with CAD s/p stent, HLD, HTN, MERVIN (not on CPAP), history of prostate cancer, TIA, hypothyroidism and paroxysmal atrial fibrillation with tachy-ondina who presents for   pacemaker implant.

## 2020-11-20 NOTE — H&P ADULT - NSICDXPASTMEDICALHX_GEN_ALL_CORE_FT
PAST MEDICAL HISTORY:  Atrial fibrillation     BPH (benign prostatic hyperplasia)     HTN (hypertension)     Hypothyroid

## 2020-11-20 NOTE — PROGRESS NOTE ADULT - SUBJECTIVE AND OBJECTIVE BOX
WINDY MUSE  [id number]    PROCEDURE:  implant of dual chamber pacemaker ([employer])      INDICATION:  tachy-ondina syndrome  paroxysmal atrial fdibrillation      ELECTROPHYSIOLOGIST(S):  Dr. Dasilva  Fellow Jeffy Ladd      ANESTHESIOLOGY:  MAC      FINDINGS:  - s/p successful implant of dual chamber pacemaker ([employer]) via cephalic cut down technique       COMPLICATIONS:  none      RECOMMENDATIONS:  - post operative anti-biotics as ordered  - CXR PA/Lat., interrogation and 12 lead EKG in AM  - resume Eliquis in 48 hours

## 2020-11-21 ENCOUNTER — TRANSCRIPTION ENCOUNTER (OUTPATIENT)
Age: 85
End: 2020-11-21

## 2020-11-21 VITALS — TEMPERATURE: 98 F

## 2020-11-21 PROCEDURE — 99239 HOSP IP/OBS DSCHRG MGMT >30: CPT

## 2020-11-21 PROCEDURE — 99238 HOSP IP/OBS DSCHRG MGMT 30/<: CPT

## 2020-11-21 PROCEDURE — 71046 X-RAY EXAM CHEST 2 VIEWS: CPT | Mod: 26

## 2020-11-21 RX ORDER — APIXABAN 2.5 MG/1
1 TABLET, FILM COATED ORAL
Qty: 0 | Refills: 0 | DISCHARGE

## 2020-11-21 RX ORDER — ACETAMINOPHEN 500 MG
2 TABLET ORAL
Qty: 24 | Refills: 0
Start: 2020-11-21 | End: 2020-11-23

## 2020-11-21 RX ADMIN — Medication 30 MILLIGRAM(S): at 05:44

## 2020-11-21 RX ADMIN — LISINOPRIL 5 MILLIGRAM(S): 2.5 TABLET ORAL at 05:44

## 2020-11-21 RX ADMIN — Medication 75 MICROGRAM(S): at 05:44

## 2020-11-21 NOTE — DISCHARGE NOTE PROVIDER - HOSPITAL COURSE
85 year old male with CAD s/p stent, HLD, HTN, MERVIN (not on CPAP), history of prostate cancer, TIA, hypothyroidism and paroxysmal atrial fibrillation with tachy-ondina who presents for   pacemaker implant. 85 year old male with CAD s/p stent, HLD, HTN, MERVIN (not on CPAP), history of prostate cancer, TIA, hypothyroidism and paroxysmal atrial fibrillation with tachy-ondina who presented for successful pacemaker implant. 85 year old male with CAD s/p stent, HLD, HTN, MERVIN (not on CPAP), history of prostate cancer, TIA, hypothyroidism and paroxysmal atrial fibrillation with tachy-ondina who underwent successful implant of dual chamber pacemaker ([employer]) via cephalic cut down technique. This AM, patient asymptomatic, VSS. Normal device interrogation.  CXR Reviewed.  Leads in good position. No evidence of pneumothorax. ELiquis to be restarted on Sunday. Stable for discharge per Dr. Mohamud. Follow up with Dr. Dasilva in 1 month.

## 2020-11-21 NOTE — DISCHARGE NOTE NURSING/CASE MANAGEMENT/SOCIAL WORK - NSDPACMPNY_GEN_ALL_CORE
Traveling alone Bill For Surgical Tray: no Performing Laboratory: [phone] Billing Type: Third-Party Bill Expected Date Of Service: 02/07/2020 Lab Facility: [id number]

## 2020-11-21 NOTE — DISCHARGE NOTE PROVIDER - NSDCCPCAREPLAN_GEN_ALL_CORE_FT
PRINCIPAL DISCHARGE DIAGNOSIS  Diagnosis: Tachy-ondina syndrome  Assessment and Plan of Treatment:       SECONDARY DISCHARGE DIAGNOSES  Diagnosis: Hypothyroidism  Assessment and Plan of Treatment:     Diagnosis: MERVIN (obstructive sleep apnea)  Assessment and Plan of Treatment:     Diagnosis: Dyslipidemia  Assessment and Plan of Treatment:     Diagnosis: Hypertension  Assessment and Plan of Treatment:     Diagnosis: Atrial fibrillation  Assessment and Plan of Treatment:      PRINCIPAL DISCHARGE DIAGNOSIS  Diagnosis: Tachy-ondina syndrome  Assessment and Plan of Treatment: You underwent a successful dual chamber pacemaker implantation ([employer]). You may resume Eliquis on Sunday 11/22. Continue all other medications as previously prescribed. Please follow up with Dr. Dasilva in 1 month.

## 2020-11-21 NOTE — DISCHARGE NOTE PROVIDER - NSDCMRMEDTOKEN_GEN_ALL_CORE_FT
atorvastatin 10 mg oral tablet: 1 tab(s) orally once a day  Eliquis 5 mg oral tablet: 1 tab(s) orally 2 times a day  lisinopril 5 mg oral tablet: 1 tab(s) orally once a day  Procardia: 30 milligram(s) orally once a day  Synthroid 75 mcg (0.075 mg) oral tablet: 1 tab(s) orally once a day   atorvastatin 10 mg oral tablet: 1 tab(s) orally once a day  Eliquis 5 mg oral tablet: 1 tab(s) orally 2 times a day; RESTART ON 11/22  lisinopril 5 mg oral tablet: 1 tab(s) orally once a day  Procardia: 30 milligram(s) orally once a day  Synthroid 75 mcg (0.075 mg) oral tablet: 1 tab(s) orally once a day

## 2020-11-21 NOTE — DISCHARGE NOTE PROVIDER - NSDCFUADDINST_GEN_ALL_CORE_FT
- No heavy lifting or straining for one month  - No raising your arm above your head for one month   - Showering is allowed, however, do not run water over the incision site. Bathing or submerging in water is not allowed until you are cleared by your doctor.   - Please monitor the incision site for any signs of swelling, bleeding, hematoma (build up of blood). Please also monitor for any signs of infection (pus, redness) and call the doctor if any of these signs/symptoms occur.

## 2020-11-21 NOTE — PROGRESS NOTE ADULT - SUBJECTIVE AND OBJECTIVE BOX
Electrophysiology Device Interrogation       Indication: s/p dual chamber pacemaker insertion    Device model: 				                                Implanting Physician: Chance Dasilva MD    Functioning Mode: DDD 		    Underlying Rhythm:  SR    Pacemaker dependency:     INTERROGATION    Battery status: Good    ATRIUM  Threshold:  Sensing:  Impedance:    VENTRICLE  Threshold:  Sensing:  Impedance:    Events/Alert: none     Parameter changes: none	     Assessment:   85 year old male with CAD s/p stent, HLD, HTN, MERVIN (not on CPAP), history of prostate cancer, TIA, hypothyroidism and paroxysmal atrial fibrillation with tachy-ondina who is s/p dual chamber PPM placement yesterday.  Normal device interrogation.  CXR Reviewed.  Leads in good position. No evidence of pneumothorax. Ready for discharge.  F/U reviewed in detail.     Electrophysiology Device Interrogation       Indication: s/p dual chamber pacemaker insertion    Device model: [employer] MRI EL				                                Implanting Physician: Chance Dasilva MD    Functioning Mode: DDD 		    Underlying Rhythm:  SR    Pacemaker dependency: No    INTERROGATION    Battery status: Good    ATRIUM  Threshold: 0.8V @ 0.5ms  Sensin.0 mV  Impedance: 548 ohm    VENTRICLE  Threshold: 0.4V @ 0.5ms  Sensin.2 mV  Impedance: 755 ohm    Events/Alert: none     Parameter changes: none	     Assessment:   85 year old male with CAD s/p stent, HLD, HTN, MERVIN (not on CPAP), history of prostate cancer, TIA, hypothyroidism and paroxysmal atrial fibrillation with tachy-ondina who is s/p dual chamber PPM placement yesterday.  Normal device interrogation.  CXR Reviewed.  Leads in good position. No evidence of pneumothorax. Wound examined without evidence of hematoma. Ready for discharge.  F/U reviewed in detail.

## 2020-11-21 NOTE — DISCHARGE NOTE PROVIDER - CARE PROVIDER_API CALL
Chance Dasilva)  Cardiac Electrophysiology  [address], [address] [place] [address]  Phone: [phone]  Fax: [phone]  Follow Up Time: 1 month

## 2020-11-27 DIAGNOSIS — E78.5 HYPERLIPIDEMIA, UNSPECIFIED: ICD-10-CM

## 2020-11-27 DIAGNOSIS — Z85.46 PERSONAL HISTORY OF MALIGNANT NEOPLASM OF PROSTATE: ICD-10-CM

## 2020-11-27 DIAGNOSIS — N40.0 BENIGN PROSTATIC HYPERPLASIA WITHOUT LOWER URINARY TRACT SYMPTOMS: ICD-10-CM

## 2020-11-27 DIAGNOSIS — I10 ESSENTIAL (PRIMARY) HYPERTENSION: ICD-10-CM

## 2020-11-27 DIAGNOSIS — G47.33 OBSTRUCTIVE SLEEP APNEA (ADULT) (PEDIATRIC): ICD-10-CM

## 2020-11-27 DIAGNOSIS — Z95.5 PRESENCE OF CORONARY ANGIOPLASTY IMPLANT AND GRAFT: ICD-10-CM

## 2020-11-27 DIAGNOSIS — I48.0 PAROXYSMAL ATRIAL FIBRILLATION: ICD-10-CM

## 2020-11-27 DIAGNOSIS — I49.5 SICK SINUS SYNDROME: ICD-10-CM

## 2020-11-27 DIAGNOSIS — Z86.73 PERSONAL HISTORY OF TRANSIENT ISCHEMIC ATTACK (TIA), AND CEREBRAL INFARCTION WITHOUT RESIDUAL DEFICITS: ICD-10-CM

## 2020-11-27 DIAGNOSIS — I25.10 ATHEROSCLEROTIC HEART DISEASE OF NATIVE CORONARY ARTERY WITHOUT ANGINA PECTORIS: ICD-10-CM

## 2020-11-27 PROBLEM — I48.91 UNSPECIFIED ATRIAL FIBRILLATION: Chronic | Status: ACTIVE | Noted: 2020-11-20

## 2020-11-30 PROCEDURE — C1785: CPT

## 2020-11-30 PROCEDURE — 71046 X-RAY EXAM CHEST 2 VIEWS: CPT

## 2020-11-30 PROCEDURE — C1894: CPT

## 2020-11-30 PROCEDURE — C1898: CPT

## 2020-11-30 PROCEDURE — C1769: CPT

## 2020-12-01 ENCOUNTER — APPOINTMENT (OUTPATIENT)
Dept: UROLOGY | Facility: CLINIC | Age: 85
End: 2020-12-01
Payer: MEDICARE

## 2020-12-01 VITALS — SYSTOLIC BLOOD PRESSURE: 126 MMHG | DIASTOLIC BLOOD PRESSURE: 72 MMHG | TEMPERATURE: 98.2 F | HEART RATE: 80 BPM

## 2020-12-01 PROCEDURE — 99213 OFFICE O/P EST LOW 20 MIN: CPT

## 2020-12-01 NOTE — ASSESSMENT
[FreeTextEntry1] : In summary, the patient is 85 year old male, here for PCa follow up, GG1, soft base nodule on PORTILLO (appears to correlated with BPH nodule on left base) - a biopsy was offered but probability of CS disease is low, patient deferred rebiopsy.\par \par PVR 10 cc.\par \par 1. PSA / prostate cancer on AS with Dr. Garcia (MRI was low risk)\par 2. BPH symptoms has gotten worse - alpha blockers ( 7 years) \par 3. He has high volume voids at night (MERVIN and cardiac edema) - pacemaker resolved LE edema and nocturia x~ 20%  \par 4. he is on eliquist - TR approach for a PAE is possible once we assess other medical issues.\par 5. PVR and Flow next visit\par 6. Needs to get CPAP mask.\par \par Thank you very much for allowing me to assist in the care of this patient. Should you have any additional questions or concerns please do not hesitate to contact me.\par \par \par Sincerely,\par \par \par Sin Kulkarni D.O.\par  of Urology and Radiology\par  of Urology at [facility]\par System Director for Prostate Cancer\par [address]\par Phone: [phone]

## 2020-12-01 NOTE — HISTORY OF PRESENT ILLNESS
[FreeTextEntry1] : Dear Silvio Garcia and Dr. 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  The patient's lower extremity edema resolved with pacemaker.  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  Needs a CPAP masked ordered.  he is taking uroxatral which he feels is not helping for his 80 gram. \par \par The nocturia has improved slightly due to less lower extremity edema resolution.  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  The patient is on Eliquist for history of Afib.  \par \par Had evaluation for MERVIN, has severe Apnea, not using machine at this point. \par MRI ([facility]) read 09/27/20. 58 cc, PSAD 0.12, PIRADS 3 lesion measuring 1.1 cm in the Right posteromedial midgland PZ. No LAD. No EPE. No Bony Lesions. The images have been reviewed and clinical implications discussed with the patient.\par \par With regards to LUTS, it has gotten worse - nocturia x 5 - patient does not snore and has never been assessed for sleep apnea .  He makes more urine at night than the day.  He has been taking Alfuzosin for the last 7 years.  He has a slow flow.  \par \par At that time he was interested in nanoparticle. His most recent PSA at that time was 6.4 in 02/2019. His PSA was 7.1 in 07/2016. Patient has had a rising PSA since 2015. He has had 1 prostate biopsy on 07/16/2016. It was positive in the right lobe with a GG1, 10%, 1.5 mm - This may correlate with small area (< 0.2 cc) on MRI appreciated on slice #20. Patient has not had an MRI done in the past. Patient is on Uroxatral for many years but not on Finasteride. He is not sexually active. Brother had prostate cancer.\par \par MRI dated 9/27/20 - 80 cc prostate right mid pzpm with no signs of EPE or LAD or bony disease.  We have reviewd the films together\par \par 12/01/2020\par IPSS 29 QOL 5 Nocturia x 4 \par GLADIS NR\par \par 10/06/2020 \par IPSS 30  QOL 5\par GLADIS 1\par \par The patient denies fevers, chills, nausea and or vomiting and no unexplained weight loss.\par All pertinent laboratory, films and physician notes were reviewed.  Questionnaire results were discussed with patient.\par \par

## 2020-12-01 NOTE — PHYSICAL EXAM
[General Appearance - Well Developed] : well developed [General Appearance - Well Nourished] : well nourished [Abdomen Soft] : soft [Urethral Meatus] : meatus normal [Testes Tenderness] : no tenderness of the testes [Testes Mass (___cm)] : there were no testicular masses [No Prostate Nodules] : no prostate nodules [Prostate Size ___ gm] : prostate size [unfilled] gm [] : no respiratory distress [Oriented To Time, Place, And Person] : oriented to person, place, and time [Not Anxious] : not anxious [Normal Station and Gait] : the gait and station were normal for the patient's age [No Focal Deficits] : no focal deficits [Edema] : no peripheral edema [Affect] : the affect was normal

## 2020-12-09 ENCOUNTER — APPOINTMENT (OUTPATIENT)
Dept: HEART AND VASCULAR | Facility: CLINIC | Age: 85
End: 2020-12-09
Payer: MEDICARE

## 2020-12-09 VITALS
HEIGHT: 67 IN | BODY MASS INDEX: 24.33 KG/M2 | WEIGHT: 155 LBS | DIASTOLIC BLOOD PRESSURE: 73 MMHG | TEMPERATURE: 97.5 F | HEART RATE: 82 BPM | SYSTOLIC BLOOD PRESSURE: 150 MMHG

## 2020-12-09 PROCEDURE — 93280 PM DEVICE PROGR EVAL DUAL: CPT

## 2020-12-09 NOTE — REASON FOR VISIT
[Follow-up Device Check] : is here today for a follow-up device check visit for [Follow-Up - Clinic] : a clinic follow-up of [FreeTextEntry1] : tachy-ondina

## 2020-12-09 NOTE — PROCEDURE
[No] : not [NSR] : normal sinus rhythm [Pacemaker] : pacemaker [DDDR] : DDDR [Lead Imp:  ___ohms] : lead impedance was [unfilled] ohms [Sensing Amplitude ___mv] : sensing amplitude was [unfilled] mv [___V @] : [unfilled] V [___ ms] : [unfilled] ms [de-identified] : [facility]  [de-identified] : [employer] MRI [de-identified] : 021952 [de-identified] : 11/2020 [de-identified] : 50/130 [de-identified] : 15 years  [de-identified] : added rate response [de-identified] : AP 12%\par  <1%\par afib burden <1% - all episodes consistent with this

## 2020-12-09 NOTE — PHYSICAL EXAM
[General Appearance - Well Developed] : well developed [Normal Appearance] : normal appearance [Well Groomed] : well groomed [General Appearance - Well Nourished] : well nourished [No Deformities] : no deformities [General Appearance - In No Acute Distress] : no acute distress [] : no respiratory distress [Respiration, Rhythm And Depth] : normal respiratory rhythm and effort [Exaggerated Use Of Accessory Muscles For Inspiration] : no accessory muscle use [Auscultation Breath Sounds / Voice Sounds] : lungs were clear to auscultation bilaterally [Cyanosis, Localized] : no localized cyanosis [Normal Conjunctiva] : the conjunctiva exhibited no abnormalities [Normal Oral Mucosa] : normal oral mucosa [Normal Jugular Venous V Waves Present] : normal jugular venous V waves present [5th Left ICS - MCL] : palpated at the 5th LICS in the midclavicular line [Normal] : normal [No Precordial Heave] : no precordial heave was noted [Apical Thrill] : no thrill palpable at the apex [Normal Rate] : normal [Rhythm Regular] : regular [Normal S1] : normal S1 [Normal S2] : normal S2 [No Pitting Edema] : no pitting edema present [Abnormal Walk] : normal gait [Skin Color & Pigmentation] : normal skin color and pigmentation [Oriented To Time, Place, And Person] : oriented to person, place, and time [Impaired Insight] : insight and judgment were intact [Affect] : the affect was normal [Mood] : the mood was normal [No Anxiety] : not feeling anxious [Heart Rate And Rhythm] : heart rate and rhythm were normal [Heart Sounds] : normal S1 and S2 [Clean] : clean [Dry] : dry [Well-Healed] : well-healed [Palpable Crepitus] : no palpable crepitus [Bleeding] : no active bleeding [Foul Odor] : no foul smell [Purulent Drainage] : no purulent drainage [Serosanguineous Drainage] : no serosanquineous drainage [Serous Drainage] : no serous drainage [Erythema] : not erythematous [Warm] : not warm [Tender] : not tender [Indurated] : not indurated [Fluctuant] : not fluctuant

## 2020-12-09 NOTE — HISTORY OF PRESENT ILLNESS
[FreeTextEntry1] : 85 year old male with CAD s/p stent, HLD, HTN, MERVIN (not on CPAP), history of prostate cancer, TIA, hypothyroidism and paroxysmal atrial fibrillation with tachy-ondina s/p pacemaker implant. \par \par He states he has a long standing history of atrial fibrillation but it has always been paroxysmal and never needed a cardioversion or been on an antiarrhythmic medication.  His last episode was a few days ago when he experienced palpitations.  No precipitating or alleviating factors   He wore an event monitor recently with heart rates from  with a mean rate of 60.  He ultimately underwent a dual chamber pacemaker and notes an improvement in the way he feels.  He notes more energy and states his LE edema has resolved.  No lightheadedness.  No chest pain, syncope, BARRETO.

## 2020-12-31 ENCOUNTER — NON-APPOINTMENT (OUTPATIENT)
Age: 85
End: 2020-12-31

## 2020-12-31 ENCOUNTER — APPOINTMENT (OUTPATIENT)
Dept: HEART AND VASCULAR | Facility: CLINIC | Age: 85
End: 2020-12-31
Payer: MEDICARE

## 2020-12-31 VITALS
HEIGHT: 67 IN | HEART RATE: 55 BPM | DIASTOLIC BLOOD PRESSURE: 64 MMHG | TEMPERATURE: 99.2 F | WEIGHT: 150.99 LBS | OXYGEN SATURATION: 97 % | SYSTOLIC BLOOD PRESSURE: 120 MMHG | BODY MASS INDEX: 23.7 KG/M2

## 2020-12-31 DIAGNOSIS — Z95.0 PRESENCE OF CARDIAC PACEMAKER: ICD-10-CM

## 2020-12-31 PROCEDURE — 93000 ELECTROCARDIOGRAM COMPLETE: CPT

## 2020-12-31 PROCEDURE — 99214 OFFICE O/P EST MOD 30 MIN: CPT

## 2020-12-31 PROCEDURE — 36415 COLL VENOUS BLD VENIPUNCTURE: CPT

## 2020-12-31 PROCEDURE — 99072 ADDL SUPL MATRL&STAF TM PHE: CPT

## 2020-12-31 RX ORDER — ATORVASTATIN CALCIUM 10 MG/1
10 TABLET, FILM COATED ORAL
Qty: 90 | Refills: 0 | Status: DISCONTINUED | COMMUNITY
Start: 2020-04-14 | End: 2020-12-31

## 2021-01-03 LAB
T4 FREE SERPL-MCNC: 1.2 NG/DL
TSH SERPL-ACNC: 1.68 UIU/ML

## 2021-01-03 NOTE — ASSESSMENT
[FreeTextEntry1] : cad on gdmt \par htn controlled\par afib on eliquis now metoprolol if has recurrent symptoms will add amiodarone i explained to  him what symptoms to look for to go to ED \par fu in three months  \par

## 2021-01-03 NOTE — HISTORY OF PRESENT ILLNESS
[FreeTextEntry1] : 85 M HTN Hypothyroid TIA Prostate cancer CAD  s/p stent to mid LAD 7/2019 RCA aneurysm normal LVEF Atrial fibrillation on Eliquis MERVIN not yet on CPAP here for follow up episode of palpitations with dizziness this AM now resolved was in ED bystolic stopped changed to metoprolol\par \par ecg NSR

## 2021-02-22 ENCOUNTER — APPOINTMENT (OUTPATIENT)
Dept: HEART AND VASCULAR | Facility: CLINIC | Age: 86
End: 2021-02-22
Payer: MEDICARE

## 2021-02-22 ENCOUNTER — NON-APPOINTMENT (OUTPATIENT)
Age: 86
End: 2021-02-22

## 2021-02-22 PROCEDURE — 93296 REM INTERROG EVL PM/IDS: CPT

## 2021-02-22 PROCEDURE — 93294 REM INTERROG EVL PM/LDLS PM: CPT

## 2021-03-17 ENCOUNTER — APPOINTMENT (OUTPATIENT)
Dept: HEART AND VASCULAR | Facility: CLINIC | Age: 86
End: 2021-03-17
Payer: MEDICARE

## 2021-03-17 VITALS
DIASTOLIC BLOOD PRESSURE: 62 MMHG | HEART RATE: 49 BPM | WEIGHT: 151.99 LBS | HEIGHT: 67 IN | SYSTOLIC BLOOD PRESSURE: 130 MMHG | OXYGEN SATURATION: 97 % | BODY MASS INDEX: 23.85 KG/M2 | TEMPERATURE: 98.8 F

## 2021-03-17 PROCEDURE — 99214 OFFICE O/P EST MOD 30 MIN: CPT

## 2021-03-17 PROCEDURE — 99072 ADDL SUPL MATRL&STAF TM PHE: CPT

## 2021-03-17 PROCEDURE — 36415 COLL VENOUS BLD VENIPUNCTURE: CPT

## 2021-03-17 PROCEDURE — 93000 ELECTROCARDIOGRAM COMPLETE: CPT

## 2021-03-17 RX ORDER — DRONEDARONE 400 MG/1
400 TABLET, FILM COATED ORAL TWICE DAILY
Qty: 60 | Refills: 5 | Status: DISCONTINUED | COMMUNITY
Start: 2021-03-17 | End: 2021-03-17

## 2021-03-17 RX ORDER — NIFEDIPINE 30 MG/1
30 TABLET, EXTENDED RELEASE ORAL
Qty: 90 | Refills: 3 | Status: DISCONTINUED | COMMUNITY
Start: 2020-09-09 | End: 2021-03-17

## 2021-03-17 NOTE — PHYSICAL EXAM
[General Appearance - Well Developed] : well developed [Normal Appearance] : normal appearance [Well Groomed] : well groomed [General Appearance - Well Nourished] : well nourished [No Deformities] : no deformities [General Appearance - In No Acute Distress] : no acute distress [Normal Conjunctiva] : the conjunctiva exhibited no abnormalities [Eyelids - No Xanthelasma] : the eyelids demonstrated no xanthelasmas [Normal Oral Mucosa] : normal oral mucosa [No Oral Pallor] : no oral pallor [No Oral Cyanosis] : no oral cyanosis [Normal Jugular Venous A Waves Present] : normal jugular venous A waves present [Normal Jugular Venous V Waves Present] : normal jugular venous V waves present [No Jugular Venous Wolfe A Waves] : no jugular venous wlofe A waves [Respiration, Rhythm And Depth] : normal respiratory rhythm and effort [Exaggerated Use Of Accessory Muscles For Inspiration] : no accessory muscle use [Auscultation Breath Sounds / Voice Sounds] : lungs were clear to auscultation bilaterally [Heart Rate And Rhythm] : heart rate and rhythm were normal [Heart Sounds] : normal S1 and S2 [Murmurs] : no murmurs present [Abdomen Soft] : soft [Abdomen Tenderness] : non-tender [Abdomen Mass (___ Cm)] : no abdominal mass palpated [Abnormal Walk] : normal gait [Gait - Sufficient For Exercise Testing] : the gait was sufficient for exercise testing [Nail Clubbing] : no clubbing of the fingernails [Cyanosis, Localized] : no localized cyanosis [Petechial Hemorrhages (___cm)] : no petechial hemorrhages [Skin Color & Pigmentation] : normal skin color and pigmentation [] : no rash [No Venous Stasis] : no venous stasis [Skin Lesions] : no skin lesions [No Skin Ulcers] : no skin ulcer [No Xanthoma] : no  xanthoma was observed [Oriented To Time, Place, And Person] : oriented to person, place, and time [Affect] : the affect was normal [Mood] : the mood was normal [No Anxiety] : not feeling anxious

## 2021-03-19 LAB
CHOLEST SERPL-MCNC: 111 MG/DL
HDLC SERPL-MCNC: 37 MG/DL
LDLC SERPL CALC-MCNC: 41 MG/DL
NONHDLC SERPL-MCNC: 74 MG/DL
T4 FREE SERPL-MCNC: 1.2 NG/DL
TRIGL SERPL-MCNC: 165 MG/DL
TSH SERPL-ACNC: 2.68 UIU/ML

## 2021-03-19 NOTE — HISTORY OF PRESENT ILLNESS
[FreeTextEntry1] : 85 M HTN Hypothyroid TIA Prostate cancer CAD  s/p stent to mid LAD 7/2019 RCA aneurysm normal LVEF Atrial fibrillation on Eliquis MERVIN not yet on CPAP here for follow up episode of was in ED for afib and given diltiazem which converted him to sinus rhythm\par \par ecg NSR

## 2021-03-19 NOTE — ASSESSMENT
[FreeTextEntry1] : cad on gdmt \par htn controlled\par afib on eliquis now metoprolol i offered him options he wants to trial diltiazem in addition to his metoprolol\par if fails will trial multaq\par fu in three months  \par

## 2021-04-20 ENCOUNTER — APPOINTMENT (OUTPATIENT)
Dept: OPHTHALMOLOGY | Facility: CLINIC | Age: 86
End: 2021-04-20
Payer: MEDICARE

## 2021-04-20 ENCOUNTER — NON-APPOINTMENT (OUTPATIENT)
Age: 86
End: 2021-04-20

## 2021-04-20 PROCEDURE — 92136 OPHTHALMIC BIOMETRY: CPT

## 2021-04-20 PROCEDURE — 92133 CPTRZD OPH DX IMG PST SGM ON: CPT

## 2021-04-20 PROCEDURE — 92014 COMPRE OPH EXAM EST PT 1/>: CPT

## 2021-04-20 PROCEDURE — 99072 ADDL SUPL MATRL&STAF TM PHE: CPT

## 2021-05-25 ENCOUNTER — TRANSCRIPTION ENCOUNTER (OUTPATIENT)
Age: 86
End: 2021-05-25

## 2021-05-25 ENCOUNTER — APPOINTMENT (OUTPATIENT)
Dept: HEART AND VASCULAR | Facility: CLINIC | Age: 86
End: 2021-05-25
Payer: MEDICARE

## 2021-05-25 ENCOUNTER — NON-APPOINTMENT (OUTPATIENT)
Age: 86
End: 2021-05-25

## 2021-05-25 PROCEDURE — 93296 REM INTERROG EVL PM/IDS: CPT

## 2021-05-25 PROCEDURE — 93294 REM INTERROG EVL PM/LDLS PM: CPT

## 2021-05-26 ENCOUNTER — APPOINTMENT (OUTPATIENT)
Dept: OPHTHALMOLOGY | Facility: AMBULATORY SURGERY CENTER | Age: 86
End: 2021-05-26

## 2021-05-26 ENCOUNTER — OUTPATIENT (OUTPATIENT)
Dept: OUTPATIENT SERVICES | Facility: HOSPITAL | Age: 86
LOS: 1 days | Discharge: HOME | End: 2021-05-26
Payer: MEDICARE

## 2021-05-26 ENCOUNTER — NON-APPOINTMENT (OUTPATIENT)
Age: 86
End: 2021-05-26

## 2021-05-26 DIAGNOSIS — Z90.49 ACQUIRED ABSENCE OF OTHER SPECIFIED PARTS OF DIGESTIVE TRACT: Chronic | ICD-10-CM

## 2021-05-26 PROCEDURE — 66982 XCAPSL CTRC RMVL CPLX WO ECP: CPT | Mod: LT

## 2021-05-27 ENCOUNTER — APPOINTMENT (OUTPATIENT)
Dept: OPHTHALMOLOGY | Facility: CLINIC | Age: 86
End: 2021-05-27
Payer: MEDICARE

## 2021-05-27 ENCOUNTER — NON-APPOINTMENT (OUTPATIENT)
Age: 86
End: 2021-05-27

## 2021-05-27 PROCEDURE — 99024 POSTOP FOLLOW-UP VISIT: CPT

## 2021-06-01 ENCOUNTER — NON-APPOINTMENT (OUTPATIENT)
Age: 86
End: 2021-06-01

## 2021-06-01 ENCOUNTER — APPOINTMENT (OUTPATIENT)
Dept: OPHTHALMOLOGY | Facility: CLINIC | Age: 86
End: 2021-06-01
Payer: MEDICARE

## 2021-06-01 PROCEDURE — 99072 ADDL SUPL MATRL&STAF TM PHE: CPT

## 2021-06-01 PROCEDURE — 92012 INTRM OPH EXAM EST PATIENT: CPT | Mod: 24

## 2021-06-09 ENCOUNTER — APPOINTMENT (OUTPATIENT)
Dept: HEART AND VASCULAR | Facility: CLINIC | Age: 86
End: 2021-06-09
Payer: MEDICARE

## 2021-06-09 VITALS
SYSTOLIC BLOOD PRESSURE: 148 MMHG | DIASTOLIC BLOOD PRESSURE: 69 MMHG | TEMPERATURE: 96.7 F | HEART RATE: 50 BPM | OXYGEN SATURATION: 95 %

## 2021-06-09 PROCEDURE — 99072 ADDL SUPL MATRL&STAF TM PHE: CPT

## 2021-06-09 PROCEDURE — 99211 OFF/OP EST MAY X REQ PHY/QHP: CPT | Mod: 25

## 2021-06-09 PROCEDURE — 93280 PM DEVICE PROGR EVAL DUAL: CPT

## 2021-06-14 NOTE — HISTORY OF PRESENT ILLNESS
[FreeTextEntry1] : 86 year old male with CAD s/p stent, HLD, HTN, MERVIN (not on CPAP), history of prostate cancer, TIA, hypothyroidism and paroxysmal atrial fibrillation with tachy-ondina s/p pacemaker implant. \par \par He states he has a long standing history of atrial fibrillation but it has always been paroxysmal and never needed a cardioversion or been on an antiarrhythmic medication.  His last episode was a few days ago when he experienced palpitations.  No precipitating or alleviating factors   He wore an event monitor recently with heart rates from  with a mean rate of 60.  He ultimately underwent a dual chamber pacemaker and notes an improvement in the way he feels.   No lightheadedness, palpitations, chest pain, syncope, BARRETO.

## 2021-06-14 NOTE — REVIEW OF SYSTEMS
[Fever] : no fever [Chills] : no chills [Feeling Fatigued] : not feeling fatigued [SOB] : no shortness of breath [Syncope] : no syncope [Negative] : Heme/Lymph

## 2021-06-14 NOTE — DISCUSSION/SUMMARY
[Pacemaker Function Normal] : normal pacemaker function [FreeTextEntry1] : 86 year old male with CAD s/p stent, HLD, HTN, MERVIN (not on CPAP), history of prostate cancer, TIA, hypothyroidism and paroxysmal atrial fibrillation with tachy-ondina s/p pacemaker implant.   PAcemaker interrogation reveals normal function and all measured data is within normal limits.  No significant afib and he is maintained on Eliquis.  He will follow up in 6 months or sooner if needed and knows to call with questions or concerns.

## 2021-06-14 NOTE — PROCEDURE
[No] : not [NSR] : normal sinus rhythm [Pacemaker] : pacemaker [DDDR] : DDDR [Lead Imp:  ___ohms] : lead impedance was [unfilled] ohms [Sensing Amplitude ___mv] : sensing amplitude was [unfilled] mv [___V @] : [unfilled] V [___ ms] : [unfilled] ms [Threshold Testing Performed] : Threshold testing was performed [None] : none [de-identified] : [facility]  [de-identified] : [employer] MRI [de-identified] : 120698 [de-identified] : 11/2020 [de-identified] : 50/130 [de-identified] : 12.5 years  [de-identified] : AP 55%\par  <1%\par afib burden <1% -

## 2021-06-14 NOTE — PHYSICAL EXAM
[General Appearance - Well Developed] : well developed [Normal Appearance] : normal appearance [Well Groomed] : well groomed [General Appearance - Well Nourished] : well nourished [No Deformities] : no deformities [General Appearance - In No Acute Distress] : no acute distress [Heart Rate And Rhythm] : heart rate and rhythm were normal [Heart Sounds] : normal S1 and S2 [] : no respiratory distress [Respiration, Rhythm And Depth] : normal respiratory rhythm and effort [Exaggerated Use Of Accessory Muscles For Inspiration] : no accessory muscle use [Auscultation Breath Sounds / Voice Sounds] : lungs were clear to auscultation bilaterally [Clean] : clean [Dry] : dry [Well-Healed] : well-healed [Normal Conjunctiva] : the conjunctiva exhibited no abnormalities [Normal Oral Mucosa] : normal oral mucosa [Normal Jugular Venous V Waves Present] : normal jugular venous V waves present [Abnormal Walk] : normal gait [Skin Color & Pigmentation] : normal skin color and pigmentation [Oriented To Time, Place, And Person] : oriented to person, place, and time [Impaired Insight] : insight and judgment were intact [Affect] : the affect was normal [Mood] : the mood was normal [No Anxiety] : not feeling anxious [Palpable Crepitus] : no palpable crepitus [Bleeding] : no active bleeding [Foul Odor] : no foul smell [Purulent Drainage] : no purulent drainage [Serosanguineous Drainage] : no serosanquineous drainage [Serous Drainage] : no serous drainage [Erythema] : not erythematous [Warm] : not warm [Tender] : not tender [Indurated] : not indurated [Fluctuant] : not fluctuant

## 2021-06-16 ENCOUNTER — APPOINTMENT (OUTPATIENT)
Dept: HEART AND VASCULAR | Facility: CLINIC | Age: 86
End: 2021-06-16

## 2021-06-22 ENCOUNTER — NON-APPOINTMENT (OUTPATIENT)
Age: 86
End: 2021-06-22

## 2021-06-22 ENCOUNTER — APPOINTMENT (OUTPATIENT)
Dept: HEART AND VASCULAR | Facility: CLINIC | Age: 86
End: 2021-06-22
Payer: MEDICARE

## 2021-06-22 VITALS
OXYGEN SATURATION: 97 % | DIASTOLIC BLOOD PRESSURE: 79 MMHG | WEIGHT: 150.99 LBS | TEMPERATURE: 98.6 F | SYSTOLIC BLOOD PRESSURE: 185 MMHG | HEIGHT: 67 IN | HEART RATE: 52 BPM | BODY MASS INDEX: 23.7 KG/M2

## 2021-06-22 PROCEDURE — 99214 OFFICE O/P EST MOD 30 MIN: CPT

## 2021-06-22 PROCEDURE — 99072 ADDL SUPL MATRL&STAF TM PHE: CPT

## 2021-06-22 NOTE — REASON FOR VISIT
[Arrhythmia/ECG Abnorrmalities] : arrhythmia/ECG abnormalities [Follow-Up - Clinic] : a clinic follow-up of [FreeTextEntry2] : CAD

## 2021-06-22 NOTE — PHYSICAL EXAM
[Well Developed] : well developed [Well Nourished] : well nourished [No Acute Distress] : no acute distress [Normal Venous Pressure] : normal venous pressure [No Carotid Bruit] : no carotid bruit [Normal S1, S2] : normal S1, S2 [No Murmur] : no murmur [No Rub] : no rub [No Gallop] : no gallop [Clear Lung Fields] : clear lung fields [Good Air Entry] : good air entry [No Respiratory Distress] : no respiratory distress  [Soft] : abdomen soft [Non Tender] : non-tender [No Masses/organomegaly] : no masses/organomegaly [Normal Bowel Sounds] : normal bowel sounds [Normal Gait] : normal gait [No Edema] : no edema [No Cyanosis] : no cyanosis [No Clubbing] : no clubbing [No Varicosities] : no varicosities [No Rash] : no rash [No Skin Lesions] : no skin lesions [Moves all extremities] : moves all extremities [No Focal Deficits] : no focal deficits [Normal Speech] : normal speech [Alert and Oriented] : alert and oriented [Normal memory] : normal memory [General Appearance - Well Developed] : well developed [Normal Appearance] : normal appearance [Well Groomed] : well groomed [General Appearance - Well Nourished] : well nourished [No Deformities] : no deformities [General Appearance - In No Acute Distress] : no acute distress [Normal Conjunctiva] : the conjunctiva exhibited no abnormalities [Eyelids - No Xanthelasma] : the eyelids demonstrated no xanthelasmas [Normal Oral Mucosa] : normal oral mucosa [No Oral Pallor] : no oral pallor [No Oral Cyanosis] : no oral cyanosis [Normal Jugular Venous A Waves Present] : normal jugular venous A waves present [Normal Jugular Venous V Waves Present] : normal jugular venous V waves present [No Jugular Venous Wolfe A Waves] : no jugular venous wolfe A waves [Respiration, Rhythm And Depth] : normal respiratory rhythm and effort [Exaggerated Use Of Accessory Muscles For Inspiration] : no accessory muscle use [Auscultation Breath Sounds / Voice Sounds] : lungs were clear to auscultation bilaterally [Heart Rate And Rhythm] : heart rate and rhythm were normal [Heart Sounds] : normal S1 and S2 [Murmurs] : no murmurs present [Abdomen Soft] : soft [Abdomen Tenderness] : non-tender [Abdomen Mass (___ Cm)] : no abdominal mass palpated [Abnormal Walk] : normal gait [Gait - Sufficient For Exercise Testing] : the gait was sufficient for exercise testing [Nail Clubbing] : no clubbing of the fingernails [Cyanosis, Localized] : no localized cyanosis [Petechial Hemorrhages (___cm)] : no petechial hemorrhages [Skin Color & Pigmentation] : normal skin color and pigmentation [] : no rash [No Venous Stasis] : no venous stasis [Skin Lesions] : no skin lesions [No Skin Ulcers] : no skin ulcer [No Xanthoma] : no  xanthoma was observed [Oriented To Time, Place, And Person] : oriented to person, place, and time [Affect] : the affect was normal [Mood] : the mood was normal [No Anxiety] : not feeling anxious

## 2021-06-22 NOTE — HISTORY OF PRESENT ILLNESS
[FreeTextEntry1] : 85 M HTN Hypothyroid TIA Prostate cancer CAD  s/p stent to mid LAD 7/2019 RCA aneurysm normal LVEF Atrial fibrillation on Eliquis MERVIN not very compliant with sleep machine recent TIA with aphasia now concerned about his BP management afib is well controlled bp at home has been in the 180's \par \par \par ecg NSR

## 2021-06-22 NOTE — ASSESSMENT
[FreeTextEntry1] : cad on gdmt \par htn elevated wondering if this is from autoregulation will increase lisinopril for now\par should see neuro ? benefit from adding asa 81 mg daily \par afib on eliquis now controlled \par fu in one week \par

## 2021-06-24 ENCOUNTER — APPOINTMENT (OUTPATIENT)
Dept: PULMONOLOGY | Facility: CLINIC | Age: 86
End: 2021-06-24
Payer: MEDICARE

## 2021-06-24 VITALS
SYSTOLIC BLOOD PRESSURE: 154 MMHG | DIASTOLIC BLOOD PRESSURE: 71 MMHG | OXYGEN SATURATION: 97 % | BODY MASS INDEX: 23.42 KG/M2 | HEIGHT: 67.5 IN | HEART RATE: 55 BPM | TEMPERATURE: 98.1 F | WEIGHT: 151 LBS

## 2021-06-24 DIAGNOSIS — Z72.821 INADEQUATE SLEEP HYGIENE: ICD-10-CM

## 2021-06-24 DIAGNOSIS — R35.1 NOCTURIA: ICD-10-CM

## 2021-06-24 PROCEDURE — 99203 OFFICE O/P NEW LOW 30 MIN: CPT

## 2021-06-24 PROCEDURE — 99072 ADDL SUPL MATRL&STAF TM PHE: CPT

## 2021-06-24 NOTE — ASSESSMENT
[FreeTextEntry1] : 85yo man w/ stable prostate CA and BPH w/ LUTS and nocturia, Afib, CAD and recently diagnosed MERVIN (reportedly severe per patient) via in-lab PSG at [facility] on [address] who was referred by Dr. Wayne for management of MERVIN and CPAP therapy.\par \par #MERVIN - severe per patient but does not have records of [facility] done [facility] earlier in the year\par - Will need to obtain access through [email or web address] to CPAP machine to ensure appropriate settings and to determine if they could be contributing to his frequent nocturnal awakenings\par - Patient was counseled and educated on appropriate strategies for when to apply mask at night, when to remove, and not taking mask off to use bathroom to help maximize compliance with therapy in the meantime\par - 3 week office follow-up to review mgmt once access to machine obtained through DME company\par \par #Frequent nocturia\par #Poor sleep hygeine\par His frequent nocturia and poor sleep hygiene are certainly not helping with his fragmented sleep (describes reading in bed for extended periods at night, drinking milk in middle of night, fluids close to bed time)\par - Counseled on improving sleep hygiene, including avoidance of any behaviors other than sleep while in the bedroom, removal of time-telling devices in bedroom, and avoiding excessive fluid intake near bedtime and especially abstaining from fluids in the middle of the night\par \par RTO 3wks\par \par Patient s/e/d with Dr. Bolton

## 2021-06-24 NOTE — REVIEW OF SYSTEMS
[Nocturia] : nocturia [Negative] : Psychiatric [Hypersomnolence] : not sleeping much more than usual [Cataplexy] :  no cataplexy

## 2021-06-24 NOTE — PHYSICAL EXAM
[General Appearance - Well Developed] : well developed [Normal Appearance] : normal appearance [Neck Appearance] : the appearance of the neck was normal [General Appearance - Well Nourished] : well nourished [] : no respiratory distress [Respiration, Rhythm And Depth] : normal respiratory rhythm and effort [Abnormal Walk] : normal gait [Skin Color & Pigmentation] : normal skin color and pigmentation [Oriented To Time, Place, And Person] : oriented to person, place, and time [Impaired Insight] : insight and judgment were intact [Normal Conjunctiva] : the conjunctiva exhibited no abnormalities [III] : III [No Focal Deficits] : no focal deficits

## 2021-06-24 NOTE — HISTORY OF PRESENT ILLNESS
[Frequent Nocturnal Awakening] : frequent nocturnal awakening [To Bed: ___] : ~he/she~ goes to bed at [unfilled] [Arises: ___] : arises at [unfilled] [Sleep Onset Latency: ___ minutes] : sleep onset latency of [unfilled] minutes reported [Nocturnal Awakenings: ___] : ~he/she~ typically has [unfilled] nocturnal awakenings [TST: ___] : Total sleep time is [unfilled] [Daytime Sleep: ___] : daytime sleep: [unfilled] [FreeTextEntry1] : Referred by cardiologist Dr. Wayne for mgmt of MERVIN and CPAP therapy.\par \par 85yo man w/ stable prostate CA and BPH w/ LUTS and nocturia, Afib, CAD and recently diagnosed MERVIN (reportedly severe per patient) via in-lab PSG at [facility] on [address] who was referred for further management of MERVIN and CPAP therapy.\par \par Patient says it took a few months following PSG earlier this year for him to obtain CPAP machine and equipment due to insurance issues. Finally got CPAP last month and has been having trouble maintaining compliance due to frequent nocturnal awakenings. Patient describes extremely fragmented sleep architecture in part due to frequent nocturnal awakenings to urinate, poor sleep hygiene, and possible need for CPAP titration.\par \par Often goes to bed around 10:30pm with 15-30min latency. Puts mask on and is able to sleep comfortably until about 1:30am where he wakes up to urinate. From then on, he needs about 60-75min to fall back asleep and usually reads in bed and takes mask off until he feels tired again. Then wakes again at 3:30, 4:30, 5:30 etc. to urinate. He cannot fall back asleep after nocturnal awakenings with CPAP mask on and therefore has not been using it after the initial sleep period early in the night. Feels use of CPAP has helped delayed time to his first nocturnal awakening compared to before therapy. Patient cannot recall names of physicians involved in his initial sleep study or who prescribed mask.\par \par Sleep study: per patient - overnight PSG in-lab at [facility] ([address]) earlier in 2021, report not available\par \par DME company: [employer]\par Mask type: Resmed N20\par CPAP machine: Resmed ? [Unintentional Sleep while Active] : no unintentional sleep while active [Unintentional Sleep While Inactive] : no unintentional sleep while inactive [Awakes Unrefreshed] : does not awake unrefreshed [Awakes with Headache] : no headache upon awakening [Awakening With Dry Mouth] : no dry mouth upon awakening [Recent  Weight Gain] : no recent weight gain [Daytime Somnolence] : no daytime somnolence [Unusual Sleep Behavior] : no unusual sleep behavior [Hypersomnolence] : no hypersomnolence [Cataplexy] : no cataplexy [Sleep Paralysis] : no sleep paralysis [Hypnagogic Hallucinations] : no hallucinations when falling asleep [Hypnopompic Hallucinations] : no hallucinations when awakening [Lower Extremity Discomfort] : no lower extremity discomfort in evening or at bedtime

## 2021-06-24 NOTE — CONSULT LETTER
[Dear  ___] : Dear  [unfilled], [Consult Letter:] : I had the pleasure of evaluating your patient, [unfilled]. [Please see my note below.] : Please see my note below. [Consult Closing:] : Thank you very much for allowing me to participate in the care of this patient.  If you have any questions, please do not hesitate to contact me. [Sincerely,] : Sincerely, [FreeTextEntry3] : Lyndsey Bolton MD\par \par [employer] [facility] [employer] [facility]\par Pulmonary, Critical Care, and Sleep Medicine\par

## 2021-06-29 ENCOUNTER — NON-APPOINTMENT (OUTPATIENT)
Age: 86
End: 2021-06-29

## 2021-06-29 ENCOUNTER — APPOINTMENT (OUTPATIENT)
Dept: HEART AND VASCULAR | Facility: CLINIC | Age: 86
End: 2021-06-29

## 2021-06-29 ENCOUNTER — APPOINTMENT (OUTPATIENT)
Dept: OPHTHALMOLOGY | Facility: CLINIC | Age: 86
End: 2021-06-29
Payer: MEDICARE

## 2021-06-29 PROCEDURE — 99024 POSTOP FOLLOW-UP VISIT: CPT

## 2021-07-02 ENCOUNTER — APPOINTMENT (OUTPATIENT)
Dept: HEART AND VASCULAR | Facility: CLINIC | Age: 86
End: 2021-07-02

## 2021-07-09 ENCOUNTER — RESULT REVIEW (OUTPATIENT)
Age: 86
End: 2021-07-09

## 2021-07-09 ENCOUNTER — OUTPATIENT (OUTPATIENT)
Dept: OUTPATIENT SERVICES | Facility: HOSPITAL | Age: 86
LOS: 1 days | End: 2021-07-09
Payer: MEDICARE

## 2021-07-09 ENCOUNTER — APPOINTMENT (OUTPATIENT)
Dept: ULTRASOUND IMAGING | Facility: HOSPITAL | Age: 86
End: 2021-07-09

## 2021-07-09 DIAGNOSIS — Z90.49 ACQUIRED ABSENCE OF OTHER SPECIFIED PARTS OF DIGESTIVE TRACT: Chronic | ICD-10-CM

## 2021-07-09 PROCEDURE — 93976 VASCULAR STUDY: CPT | Mod: 26

## 2021-07-09 PROCEDURE — 76775 US EXAM ABDO BACK WALL LIM: CPT

## 2021-07-09 PROCEDURE — 76775 US EXAM ABDO BACK WALL LIM: CPT | Mod: 26,59

## 2021-07-09 PROCEDURE — 93976 VASCULAR STUDY: CPT

## 2021-07-26 ENCOUNTER — APPOINTMENT (OUTPATIENT)
Dept: HEART AND VASCULAR | Facility: CLINIC | Age: 86
End: 2021-07-26
Payer: MEDICARE

## 2021-07-26 ENCOUNTER — APPOINTMENT (OUTPATIENT)
Dept: HEART AND VASCULAR | Facility: CLINIC | Age: 86
End: 2021-07-26

## 2021-07-26 PROCEDURE — 93880 EXTRACRANIAL BILAT STUDY: CPT

## 2021-07-26 PROCEDURE — 99072 ADDL SUPL MATRL&STAF TM PHE: CPT

## 2021-07-30 ENCOUNTER — APPOINTMENT (OUTPATIENT)
Dept: HEART AND VASCULAR | Facility: CLINIC | Age: 86
End: 2021-07-30
Payer: MEDICARE

## 2021-07-30 ENCOUNTER — NON-APPOINTMENT (OUTPATIENT)
Age: 86
End: 2021-07-30

## 2021-07-30 VITALS
DIASTOLIC BLOOD PRESSURE: 60 MMHG | HEART RATE: 50 BPM | TEMPERATURE: 97.8 F | WEIGHT: 151.99 LBS | BODY MASS INDEX: 23.85 KG/M2 | OXYGEN SATURATION: 95 % | SYSTOLIC BLOOD PRESSURE: 129 MMHG | HEIGHT: 67 IN

## 2021-07-30 PROCEDURE — 93000 ELECTROCARDIOGRAM COMPLETE: CPT

## 2021-07-30 PROCEDURE — 36415 COLL VENOUS BLD VENIPUNCTURE: CPT

## 2021-07-30 PROCEDURE — 99214 OFFICE O/P EST MOD 30 MIN: CPT

## 2021-07-30 RX ORDER — LISINOPRIL 10 MG/1
10 TABLET ORAL
Qty: 90 | Refills: 0 | Status: DISCONTINUED | COMMUNITY
Start: 2021-06-17 | End: 2021-07-30

## 2021-07-30 NOTE — ASSESSMENT
[FreeTextEntry1] : cad on gdmt \par htn stay on the current regimen\par tia asa for one year \par afib on eliquis now controlled has mild break through symptoms offered amiodarone as an alternative if it bothers him \par fu in three months

## 2021-07-30 NOTE — HISTORY OF PRESENT ILLNESS
[FreeTextEntry1] : 86 M HTN Hypothyroid TIA Prostate cancer CAD  s/p stent to mid LAD 7/2019 RCA aneurysm normal LVEF Atrial fibrillation on Eliquis MERVIN not very compliant with sleep machine,  TIA with aphasia now concerned about his BP management afib is well controlled bp at home has been in the 180's lisinopril was increased then added on his own more lisinopril bp 130-170's\par \par \par ecg NSR

## 2021-08-02 LAB
ANION GAP SERPL CALC-SCNC: 14 MMOL/L
BUN SERPL-MCNC: 15 MG/DL
CALCIUM SERPL-MCNC: 9.1 MG/DL
CHLORIDE SERPL-SCNC: 105 MMOL/L
CO2 SERPL-SCNC: 24 MMOL/L
CREAT SERPL-MCNC: 1.06 MG/DL
GLUCOSE SERPL-MCNC: 75 MG/DL
POTASSIUM SERPL-SCNC: 4.2 MMOL/L
SODIUM SERPL-SCNC: 143 MMOL/L

## 2021-08-26 ENCOUNTER — APPOINTMENT (OUTPATIENT)
Dept: PULMONOLOGY | Facility: CLINIC | Age: 86
End: 2021-08-26
Payer: MEDICARE

## 2021-08-26 VITALS
OXYGEN SATURATION: 97 % | HEIGHT: 65 IN | WEIGHT: 153 LBS | TEMPERATURE: 98 F | SYSTOLIC BLOOD PRESSURE: 147 MMHG | BODY MASS INDEX: 25.49 KG/M2 | HEART RATE: 56 BPM | RESPIRATION RATE: 12 BRPM | DIASTOLIC BLOOD PRESSURE: 75 MMHG

## 2021-08-26 DIAGNOSIS — G47.33 OBSTRUCTIVE SLEEP APNEA (ADULT) (PEDIATRIC): ICD-10-CM

## 2021-08-26 PROCEDURE — 99213 OFFICE O/P EST LOW 20 MIN: CPT

## 2021-08-26 NOTE — ASSESSMENT
[FreeTextEntry1] : REVIEWED\par [employer] data\par 5/2021 to 8/2021\par 95% adherence, 42% > 4 hours per night\par average use of 3 hours 59 min\par PAP of 9 cm H2O\par therapeutic AHI of 1.8\par excessive leak\par \par 85yo man w/ stable prostate CA and BPH w/ LUTS and nocturia, Afib, CAD and recently diagnosed MERVIN (reportedly severe per patient). Has been using PAP but does not find it comfortable. Efficacy is at goal; adherence is almost at goal of 4 hours nightly. Suggested trying another mask but he is not interested. He is interested in other treatment options. We discussed mandibular advancement as well as hypoglossal nerve stimulation. However, I cannot recommend either as I do not have his latest sleep study result for an objective review of MERVIN severity. Office has not been successful in getting a copy of the study results. We decided to pursue a home sleep apnea test. Referred to the [facility]. Advised him to stop using his current PAP for 3 nights to avoid the carry over effect which can lead to a false negative result. Follow up after HST to discuss non PAP alternatives, if any possible.

## 2021-08-26 NOTE — PHYSICAL EXAM
[General Appearance - Well Developed] : well developed [Normal Appearance] : normal appearance [General Appearance - Well Nourished] : well nourished [No Deformities] : no deformities [Normal Conjunctiva] : the conjunctiva exhibited no abnormalities [III] : III [Neck Appearance] : the appearance of the neck was normal [Heart Rate And Rhythm] : heart rate was normal and rhythm regular [] : no respiratory distress [Respiration, Rhythm And Depth] : normal respiratory rhythm and effort [Exaggerated Use Of Accessory Muscles For Inspiration] : no accessory muscle use [Auscultation Breath Sounds / Voice Sounds] : lungs were clear to auscultation bilaterally [Involuntary Movements] : no involuntary movements were seen [Nail Clubbing] : no clubbing of the fingernails [No Focal Deficits] : no focal deficits [Oriented To Time, Place, And Person] : oriented to person, place, and time [Impaired Insight] : insight and judgment were intact [Affect] : the affect was normal [Mood] : the mood was normal

## 2021-08-26 NOTE — HISTORY OF PRESENT ILLNESS
[FreeTextEntry1] : Referred by cardiologist Dr. Wayne for mgmt of MERVIN and CPAP therapy.\par \par 87yo man w/ stable prostate CA and BPH w/ LUTS and nocturia, Afib, CAD and recently diagnosed MERVIN (reportedly severe per patient) via in-lab PSG at [facility] on [address] who was referred for further management of MERVIN and CPAP therapy.\par \par Patient says it took a few months following PSG earlier this year for him to obtain CPAP machine and equipment due to insurance issues. Finally got CPAP last month and has been having trouble maintaining compliance due to frequent nocturnal awakenings. Patient describes extremely fragmented sleep architecture in part due to frequent nocturnal awakenings to urinate, poor sleep hygiene, and possible need for CPAP titration.\par \par Often goes to bed around 10:30pm with 15-30min latency. Puts mask on and is able to sleep comfortably until about 1:30am where he wakes up to urinate. From then on, he needs about 60-75min to fall back asleep and usually reads in bed and takes mask off until he feels tired again. Then wakes again at 3:30, 4:30, 5:30 etc. to urinate. He cannot fall back asleep after nocturnal awakenings with CPAP mask on and therefore has not been using it after the initial sleep period early in the night. Feels use of CPAP has helped delayed time to his first nocturnal awakening compared to before therapy. Patient cannot recall names of physicians involved in his initial sleep study or who prescribed mask.\par \par Sleep study: per patient - overnight PSG in-lab at [facility] ([address]) earlier in 2021, report not available\par \par 8/26/2021\par Doing okay on PAP but still cannot keep it on for the duration of his sleep time. Has nocturia and this is also interrupting his sleep; continues with urology follow up.\par \par DME company: [employer]\par Mask type: Resmed N20\par CPAP machine: Resmed ?

## 2021-08-31 ENCOUNTER — NON-APPOINTMENT (OUTPATIENT)
Age: 86
End: 2021-08-31

## 2021-08-31 ENCOUNTER — APPOINTMENT (OUTPATIENT)
Dept: HEART AND VASCULAR | Facility: CLINIC | Age: 86
End: 2021-08-31
Payer: MEDICARE

## 2021-08-31 PROCEDURE — 93294 REM INTERROG EVL PM/LDLS PM: CPT

## 2021-08-31 PROCEDURE — 93296 REM INTERROG EVL PM/IDS: CPT

## 2021-09-03 ENCOUNTER — APPOINTMENT (OUTPATIENT)
Dept: SLEEP CENTER | Facility: HOME HEALTH | Age: 86
End: 2021-09-03

## 2021-09-28 ENCOUNTER — NON-APPOINTMENT (OUTPATIENT)
Age: 86
End: 2021-09-28

## 2021-09-28 ENCOUNTER — APPOINTMENT (OUTPATIENT)
Dept: OPHTHALMOLOGY | Facility: CLINIC | Age: 86
End: 2021-09-28
Payer: MEDICARE

## 2021-09-28 PROCEDURE — 92133 CPTRZD OPH DX IMG PST SGM ON: CPT

## 2021-09-28 PROCEDURE — 92014 COMPRE OPH EXAM EST PT 1/>: CPT

## 2021-10-12 ENCOUNTER — TRANSCRIPTION ENCOUNTER (OUTPATIENT)
Age: 86
End: 2021-10-12

## 2021-10-13 ENCOUNTER — NON-APPOINTMENT (OUTPATIENT)
Age: 86
End: 2021-10-13

## 2021-10-13 ENCOUNTER — OUTPATIENT (OUTPATIENT)
Dept: OUTPATIENT SERVICES | Facility: HOSPITAL | Age: 86
LOS: 1 days | Discharge: HOME | End: 2021-10-13
Payer: MEDICARE

## 2021-10-13 ENCOUNTER — APPOINTMENT (OUTPATIENT)
Dept: OPHTHALMOLOGY | Facility: AMBULATORY SURGERY CENTER | Age: 86
End: 2021-10-13

## 2021-10-13 DIAGNOSIS — Z90.49 ACQUIRED ABSENCE OF OTHER SPECIFIED PARTS OF DIGESTIVE TRACT: Chronic | ICD-10-CM

## 2021-10-13 PROCEDURE — 66982 XCAPSL CTRC RMVL CPLX WO ECP: CPT | Mod: RT

## 2021-10-13 PROCEDURE — 92136 OPHTHALMIC BIOMETRY: CPT | Mod: 26

## 2021-10-14 ENCOUNTER — APPOINTMENT (OUTPATIENT)
Dept: OPHTHALMOLOGY | Facility: CLINIC | Age: 86
End: 2021-10-14
Payer: MEDICARE

## 2021-10-14 ENCOUNTER — NON-APPOINTMENT (OUTPATIENT)
Age: 86
End: 2021-10-14

## 2021-10-14 PROCEDURE — 99024 POSTOP FOLLOW-UP VISIT: CPT

## 2021-10-19 ENCOUNTER — APPOINTMENT (OUTPATIENT)
Dept: OPHTHALMOLOGY | Facility: CLINIC | Age: 86
End: 2021-10-19
Payer: MEDICARE

## 2021-10-19 ENCOUNTER — NON-APPOINTMENT (OUTPATIENT)
Age: 86
End: 2021-10-19

## 2021-10-19 PROCEDURE — 92012 INTRM OPH EXAM EST PATIENT: CPT | Mod: 24

## 2021-11-02 ENCOUNTER — APPOINTMENT (OUTPATIENT)
Dept: OPHTHALMOLOGY | Facility: CLINIC | Age: 86
End: 2021-11-02
Payer: MEDICARE

## 2021-11-02 ENCOUNTER — NON-APPOINTMENT (OUTPATIENT)
Age: 86
End: 2021-11-02

## 2021-11-02 PROCEDURE — 92012 INTRM OPH EXAM EST PATIENT: CPT | Mod: 24

## 2021-11-04 ENCOUNTER — APPOINTMENT (OUTPATIENT)
Dept: NEUROLOGY | Facility: CLINIC | Age: 86
End: 2021-11-04
Payer: MEDICARE

## 2021-11-04 VITALS
TEMPERATURE: 97.2 F | SYSTOLIC BLOOD PRESSURE: 177 MMHG | OXYGEN SATURATION: 94 % | RESPIRATION RATE: 14 BRPM | WEIGHT: 155 LBS | DIASTOLIC BLOOD PRESSURE: 66 MMHG | HEIGHT: 67.5 IN | BODY MASS INDEX: 24.05 KG/M2 | HEART RATE: 52 BPM

## 2021-11-04 PROCEDURE — 99204 OFFICE O/P NEW MOD 45 MIN: CPT

## 2021-11-04 RX ORDER — DILTIAZEM HYDROCHLORIDE 120 MG/1
120 CAPSULE, EXTENDED RELEASE ORAL
Refills: 0 | Status: DISCONTINUED | COMMUNITY
Start: 2021-06-09 | End: 2021-11-04

## 2021-11-04 RX ORDER — PANTOPRAZOLE 40 MG/1
40 TABLET, DELAYED RELEASE ORAL
Refills: 1 | Status: DISCONTINUED | COMMUNITY
End: 2021-11-04

## 2021-11-04 RX ORDER — DIAZEPAM 2 MG/1
2 TABLET ORAL
Qty: 5 | Refills: 0 | Status: DISCONTINUED | COMMUNITY
Start: 2018-04-05 | End: 2021-11-04

## 2021-11-04 NOTE — HISTORY OF PRESENT ILLNESS
[FreeTextEntry1] : The patient is a very pleasant 86-year-old gentleman with a history of CAD status post PCI,  s/p pacemaker. atrial fibrillation for which he is on Eliquis, MERVIN, prostate cancer (in remission), hypothyroidism, and HTN. He is here for evaluation of possible TIA.\par \par The patient states he experienced a 15-second episode of aphasia approximately 6 months ago.  He had had a similar spell 5 years prior.  There is no associated weakness, numbness, changes in vision, or other neurologic symptoms.  He was on Eliquis at the time.  Aspirin 81 mg daily was added to his regimen following.  Most recent cholesterols from March 2021 revealed an LDL 41, total cholesterol 111, triglycerides 165.  Carotid ultrasound was ordered but not performed.

## 2021-11-04 NOTE — PHYSICAL EXAM
[FreeTextEntry1] : Alert.  Fully oriented.  MOCA 27/30. Speech and language are intact.  Cranial nerves II-XII are intact.  Motor exam reveals intact strength with individual muscle testing in bilateral upper and lower extremities.  Tone is normal.  Reflexes are normal.  Toes are downgoing.  Sensation is intact to light touch in distal extremities.  Finger-to-nose and heel-to-shin are intact.  Rapid alternating movements are normal in the upper and lower extremities.  Gait is normal.\par

## 2021-11-04 NOTE — ASSESSMENT
[FreeTextEntry1] : The patient is a very pleasance 86 year old male with mutliple vascular risk factors presenting for evaluation of possible TIA (aphasia w/o other symptoms) that occurred about 6 months ago.  He had an all loss for sure actually prefer CTs I can just discuss like him everything better but he is medically optimized with Eliquis, ASA 81 mg, and aotvastatin 40 mg daily.  The only study which would like  at this point would be CTA of the head and more important neck to r/o significant stenosis which I have ordered.. For now, he will continue current medication regimen. Goal BP : Normotension. He will log his BP's and bring them to his next appt with Dr. Wayne. Recommended healthy diet, exercise, MERVIN treatment. Return in 1 month to review results.

## 2021-11-10 ENCOUNTER — NON-APPOINTMENT (OUTPATIENT)
Age: 86
End: 2021-11-10

## 2021-11-10 ENCOUNTER — APPOINTMENT (OUTPATIENT)
Dept: HEART AND VASCULAR | Facility: CLINIC | Age: 86
End: 2021-11-10
Payer: MEDICARE

## 2021-11-10 VITALS
WEIGHT: 154.98 LBS | HEART RATE: 59 BPM | HEIGHT: 67.5 IN | DIASTOLIC BLOOD PRESSURE: 79 MMHG | BODY MASS INDEX: 24.04 KG/M2 | SYSTOLIC BLOOD PRESSURE: 144 MMHG | OXYGEN SATURATION: 98 % | TEMPERATURE: 98 F

## 2021-11-10 DIAGNOSIS — I25.10 ATHEROSCLEROTIC HEART DISEASE OF NATIVE CORONARY ARTERY W/OUT ANGINA PECTORIS: ICD-10-CM

## 2021-11-10 DIAGNOSIS — Z98.61 ATHEROSCLEROTIC HEART DISEASE OF NATIVE CORONARY ARTERY W/OUT ANGINA PECTORIS: ICD-10-CM

## 2021-11-10 PROCEDURE — 93000 ELECTROCARDIOGRAM COMPLETE: CPT

## 2021-11-10 PROCEDURE — 99213 OFFICE O/P EST LOW 20 MIN: CPT

## 2021-11-10 RX ORDER — METOPROLOL TARTRATE 50 MG/1
50 TABLET, FILM COATED ORAL TWICE DAILY
Qty: 180 | Refills: 3 | Status: DISCONTINUED | COMMUNITY
Start: 1900-01-01 | End: 2021-11-10

## 2021-11-10 NOTE — ASSESSMENT
[FreeTextEntry1] : 87 yo male with PHMx of PPM, CAD s/p stent to mid LAD x 7/2019, RCA aneurysm, Afib (on Eliquis), hypothyroid, TIA, prostate cancer, MERVIN (noncompliant with sleep machine) presented to the office for BP management.\par \par 1. HTN: in office BP shows 144/ 79 and per pt's BP log ranges 147-189/60-77 will change his Metoprolol Tartrate 50mg BID to Metoprolol Succinate ER 50mg. Pt is to transition tomorrow morning.\par 2. Afib: EKG shows sinus rhythm, continue Eliquis and Diltiazem \par 3. H/o TIA with aphasia: continue ASA for 1 year\par 4. F/u with Dr. Wayne in 1 month\par \par \par

## 2021-11-10 NOTE — HISTORY OF PRESENT ILLNESS
[FreeTextEntry1] : 85 yo male with PHMx of PPM, CAD s/p stent to mid LAD x 7/2019, RCA aneurysm, Afib (on Eliquis), hypothyroid, TIA, prostate cancer, MERVIN (noncompliant with sleep machine) presented to the office for BP management. Stated home SBP have been ranging from 147-189. This morning he have an episode of Afib feeling irregular at 10AM for 10mins. Stated he did not taking his Diltiazem today. Also stated on 10/26 he received his Pfizer booster and the next day, his whole body was paralysis where he had to drag himself off his bed and called for help. He was taken to OSH [facility] and was admitted for 3 days. Per pt he was given IV fluid and stated his troponin was elevated there. Denies any chest pain, SOB, no palpitation, no light headedness, no edema. \par \par EKG 11/10: Sinus bradycardia, first degree AVB\par

## 2021-11-17 ENCOUNTER — OUTPATIENT (OUTPATIENT)
Dept: OUTPATIENT SERVICES | Facility: HOSPITAL | Age: 86
LOS: 1 days | End: 2021-11-17

## 2021-11-17 ENCOUNTER — RESULT REVIEW (OUTPATIENT)
Age: 86
End: 2021-11-17

## 2021-11-17 ENCOUNTER — APPOINTMENT (OUTPATIENT)
Dept: CT IMAGING | Facility: CLINIC | Age: 86
End: 2021-11-17
Payer: MEDICARE

## 2021-11-17 DIAGNOSIS — Z90.49 ACQUIRED ABSENCE OF OTHER SPECIFIED PARTS OF DIGESTIVE TRACT: Chronic | ICD-10-CM

## 2021-11-17 PROCEDURE — 70496 CT ANGIOGRAPHY HEAD: CPT | Mod: 26

## 2021-11-17 PROCEDURE — 70450 CT HEAD/BRAIN W/O DYE: CPT | Mod: 26

## 2021-11-17 PROCEDURE — 70498 CT ANGIOGRAPHY NECK: CPT | Mod: 26

## 2021-12-01 ENCOUNTER — APPOINTMENT (OUTPATIENT)
Dept: HEART AND VASCULAR | Facility: CLINIC | Age: 86
End: 2021-12-01
Payer: MEDICARE

## 2021-12-01 ENCOUNTER — NON-APPOINTMENT (OUTPATIENT)
Age: 86
End: 2021-12-01

## 2021-12-01 VITALS
DIASTOLIC BLOOD PRESSURE: 68 MMHG | HEIGHT: 67.5 IN | BODY MASS INDEX: 24.04 KG/M2 | SYSTOLIC BLOOD PRESSURE: 168 MMHG | TEMPERATURE: 97.5 F | WEIGHT: 154.98 LBS | OXYGEN SATURATION: 97 % | HEART RATE: 56 BPM

## 2021-12-01 PROCEDURE — 99214 OFFICE O/P EST MOD 30 MIN: CPT

## 2021-12-01 PROCEDURE — 93000 ELECTROCARDIOGRAM COMPLETE: CPT

## 2021-12-01 PROCEDURE — 36415 COLL VENOUS BLD VENIPUNCTURE: CPT

## 2021-12-02 ENCOUNTER — APPOINTMENT (OUTPATIENT)
Dept: OPHTHALMOLOGY | Facility: CLINIC | Age: 86
End: 2021-12-02
Payer: MEDICARE

## 2021-12-02 ENCOUNTER — NON-APPOINTMENT (OUTPATIENT)
Age: 86
End: 2021-12-02

## 2021-12-02 LAB
ALBUMIN SERPL ELPH-MCNC: 4.5 G/DL
ALP BLD-CCNC: 80 U/L
ALT SERPL-CCNC: 22 U/L
ANION GAP SERPL CALC-SCNC: 14 MMOL/L
AST SERPL-CCNC: 27 U/L
BASOPHILS # BLD AUTO: 0.03 K/UL
BASOPHILS NFR BLD AUTO: 0.5 %
BILIRUB SERPL-MCNC: 1.3 MG/DL
BUN SERPL-MCNC: 13 MG/DL
CALCIUM SERPL-MCNC: 9.4 MG/DL
CHLORIDE SERPL-SCNC: 107 MMOL/L
CHOLEST SERPL-MCNC: 128 MG/DL
CO2 SERPL-SCNC: 23 MMOL/L
CREAT SERPL-MCNC: 1.04 MG/DL
EOSINOPHIL # BLD AUTO: 0.19 K/UL
EOSINOPHIL NFR BLD AUTO: 3.3 %
ESTIMATED AVERAGE GLUCOSE: 97 MG/DL
GLUCOSE SERPL-MCNC: 114 MG/DL
HBA1C MFR BLD HPLC: 5 %
HCT VFR BLD CALC: 47.7 %
HDLC SERPL-MCNC: 42 MG/DL
HGB BLD-MCNC: 15.6 G/DL
IMM GRANULOCYTES NFR BLD AUTO: 0.3 %
LDLC SERPL CALC-MCNC: 56 MG/DL
LYMPHOCYTES # BLD AUTO: 0.5 K/UL
LYMPHOCYTES NFR BLD AUTO: 8.6 %
MAN DIFF?: NORMAL
MCHC RBC-ENTMCNC: 31.6 PG
MCHC RBC-ENTMCNC: 32.7 GM/DL
MCV RBC AUTO: 96.8 FL
MONOCYTES # BLD AUTO: 0.44 K/UL
MONOCYTES NFR BLD AUTO: 7.5 %
NEUTROPHILS # BLD AUTO: 4.65 K/UL
NEUTROPHILS NFR BLD AUTO: 79.8 %
NONHDLC SERPL-MCNC: 86 MG/DL
PLATELET # BLD AUTO: 150 K/UL
POTASSIUM SERPL-SCNC: 4.1 MMOL/L
PROT SERPL-MCNC: 7 G/DL
RBC # BLD: 4.93 M/UL
RBC # FLD: 13.6 %
SODIUM SERPL-SCNC: 144 MMOL/L
T4 FREE SERPL-MCNC: 1.3 NG/DL
TRIGL SERPL-MCNC: 149 MG/DL
TSH SERPL-ACNC: 1.53 UIU/ML
WBC # FLD AUTO: 5.83 K/UL

## 2021-12-02 PROCEDURE — 92014 COMPRE OPH EXAM EST PT 1/>: CPT | Mod: 24

## 2021-12-02 NOTE — ASSESSMENT
[FreeTextEntry1] : cad on gdmt \par htn add hydrochlorothiazide for bp sudden accelerated HTN kidney US no CHARLA but if no improvement then will do a CCTA \par tia asa for one year \par afib on eliquis now controlled has mild break through symptoms offered amiodarone as an alternative if it bothers him \par fu in one month

## 2021-12-02 NOTE — HISTORY OF PRESENT ILLNESS
[FreeTextEntry1] : 86 M HTN Hypothyroid TIA Prostate cancer CAD  s/p stent to mid LAD 7/2019 RCA aneurysm normal LVEF Atrial fibrillation on Eliquis MERVIN not very compliant with sleep machine,  TIA with aphasia now concerned about his BP management afib episode once every 8-10 weeks bp at home still high he is more concerned about his bp then afib at the moment\par \par \par ecg NSR

## 2021-12-06 LAB
CREAT UR-MCNC: 242 MG/DL
METANEPHS 24H UR-MCNC: 180 UG/L
METANEPHS/CREAT UR: 0.4
NORMETANEPHRINE 24H UR-MCNC: 599 UG/L

## 2021-12-08 LAB
METANEPHRINE, PL: 24.8 PG/ML
NORMETANEPHRINE, PL: 234.4 PG/ML

## 2021-12-14 ENCOUNTER — NON-APPOINTMENT (OUTPATIENT)
Age: 86
End: 2021-12-14

## 2021-12-14 ENCOUNTER — APPOINTMENT (OUTPATIENT)
Dept: HEART AND VASCULAR | Facility: CLINIC | Age: 86
End: 2021-12-14
Payer: MEDICARE

## 2021-12-14 PROCEDURE — 93296 REM INTERROG EVL PM/IDS: CPT

## 2021-12-14 PROCEDURE — 93294 REM INTERROG EVL PM/LDLS PM: CPT

## 2022-01-03 ENCOUNTER — RX RENEWAL (OUTPATIENT)
Age: 87
End: 2022-01-03

## 2022-01-05 ENCOUNTER — APPOINTMENT (OUTPATIENT)
Dept: HEART AND VASCULAR | Facility: CLINIC | Age: 87
End: 2022-01-05
Payer: MEDICARE

## 2022-01-05 ENCOUNTER — NON-APPOINTMENT (OUTPATIENT)
Age: 87
End: 2022-01-05

## 2022-01-05 VITALS
HEART RATE: 61 BPM | SYSTOLIC BLOOD PRESSURE: 154 MMHG | HEIGHT: 67.5 IN | TEMPERATURE: 97.2 F | DIASTOLIC BLOOD PRESSURE: 80 MMHG | BODY MASS INDEX: 23.89 KG/M2 | OXYGEN SATURATION: 97 % | WEIGHT: 153.99 LBS

## 2022-01-05 PROCEDURE — 99214 OFFICE O/P EST MOD 30 MIN: CPT

## 2022-01-05 PROCEDURE — 93000 ELECTROCARDIOGRAM COMPLETE: CPT

## 2022-01-05 RX ORDER — DILTIAZEM HYDROCHLORIDE 120 MG/1
120 CAPSULE, EXTENDED RELEASE ORAL
Qty: 90 | Refills: 2 | Status: DISCONTINUED | COMMUNITY
Start: 2021-03-17 | End: 2022-01-05

## 2022-01-06 NOTE — HISTORY OF PRESENT ILLNESS
[FreeTextEntry1] : 86 M HTN Hypothyroid TIA Prostate cancer CAD  s/p stent to mid LAD 7/2019 RCA aneurysm normal LVEF Atrial fibrillation on Eliquis MERVIN not very compliant with sleep machine,  TIA with aphasia \par \par now concerned about his BP management afib episode once every 8-10 weeks bp at home still high he is more concerned about his bp then afib at the moment never started HCTZ\par \par \par ecg NSR

## 2022-01-06 NOTE — ASSESSMENT
[FreeTextEntry1] : cad on gdmt \par htn add hydrochlorothiazide for bp sudden accelerated HTN kidney US no CHARLA but if no improvement then will do a CCTA \par tia asa for one year \par afib on eliquis now controlled has mild break through symptoms offered amiodarone as an alternative if it bothers him \par fu in 3 months

## 2022-01-27 ENCOUNTER — RX RENEWAL (OUTPATIENT)
Age: 87
End: 2022-01-27

## 2022-01-28 ENCOUNTER — APPOINTMENT (OUTPATIENT)
Dept: NEUROLOGY | Facility: CLINIC | Age: 87
End: 2022-01-28
Payer: MEDICARE

## 2022-01-28 VITALS
SYSTOLIC BLOOD PRESSURE: 155 MMHG | OXYGEN SATURATION: 96 % | DIASTOLIC BLOOD PRESSURE: 68 MMHG | HEART RATE: 52 BPM | BODY MASS INDEX: 24.35 KG/M2 | TEMPERATURE: 97.7 F | HEIGHT: 67.5 IN | WEIGHT: 157 LBS

## 2022-01-28 PROCEDURE — 99213 OFFICE O/P EST LOW 20 MIN: CPT

## 2022-01-28 RX ORDER — KRILL/OM-3/DHA/EPA/PHOSPHO/AST 1000-230MG
81 CAPSULE ORAL DAILY
Qty: 60 | Refills: 0 | Status: DISCONTINUED | COMMUNITY
Start: 2021-07-30 | End: 2022-01-28

## 2022-01-28 NOTE — ASSESSMENT
[FreeTextEntry1] : The patient is a very pleasant 86-year-old gentleman with a history of CAD status post PCI, s/p pacemaker. atrial fibrillation for which he is on Eliquis, MERVIN, prostate cancer (in remission), hypothyroidism, HTN, and transient episode of aphasia in mid 2021 concerning for TIA. Thankfully he has had no recurrent symptoms concerning for TIA/stroke. He will continue Eliquis, statin therapy.  Plan for PT for balance training. RTC 6 months.

## 2022-01-28 NOTE — HISTORY OF PRESENT ILLNESS
[FreeTextEntry1] : The patient is a very pleasant 86-year-old gentleman with a history of CAD status post PCI, s/p pacemaker. atrial fibrillation for which he is on Eliquis, MERVIN, prostate cancer (in remission), hypothyroidism, HTN, and transient episode of aphasia in mid 2021 concerning for TIA. He is here for follow-up. Notably, CTA head/neck was unremarkable for large vessel disease. \par \par THe patient denies any recurrent stroke/TIA symptoms. He does report he had two episodes of tachycardia in one day a few weeks ago during which he felt dizzy/lightheaded such that he needed to lay down. He had no other neurologic symptoms and the episodes lasted about 10 minutes. He is also noticed he is a bit more imblanced at times, particularly when leaning forward. He has not fallen.  He is otherwise tolerating Eliquis, statin, and anti-HTN medications without difficulty. He is no longer taking ASA 81mg.

## 2022-01-28 NOTE — PHYSICAL EXAM
[FreeTextEntry1] : Alert.  Fully oriented.  Speech and language are intact.  Cranial nerves II-XII are intact.  Motor exam reveals intact strength with individual muscle testing in bilateral upper and lower extremities.  Tone is normal. Finger-to-nose and heel-to-shin are intact.  Rapid alternating movements are normal in the upper and lower extremities.  Gait is normal. He is able to walk on heels, toes, and in tandem without difficulty. Romberg negative.\par

## 2022-03-15 ENCOUNTER — APPOINTMENT (OUTPATIENT)
Dept: HEART AND VASCULAR | Facility: CLINIC | Age: 87
End: 2022-03-15
Payer: MEDICARE

## 2022-03-15 ENCOUNTER — NON-APPOINTMENT (OUTPATIENT)
Age: 87
End: 2022-03-15

## 2022-03-15 PROCEDURE — 93296 REM INTERROG EVL PM/IDS: CPT

## 2022-03-15 PROCEDURE — 93294 REM INTERROG EVL PM/LDLS PM: CPT

## 2022-04-05 ENCOUNTER — APPOINTMENT (OUTPATIENT)
Dept: HEART AND VASCULAR | Facility: CLINIC | Age: 87
End: 2022-04-05
Payer: MEDICARE

## 2022-04-05 ENCOUNTER — NON-APPOINTMENT (OUTPATIENT)
Age: 87
End: 2022-04-05

## 2022-04-05 VITALS
OXYGEN SATURATION: 98 % | WEIGHT: 153.99 LBS | TEMPERATURE: 97.2 F | SYSTOLIC BLOOD PRESSURE: 139 MMHG | HEIGHT: 67.5 IN | BODY MASS INDEX: 23.89 KG/M2 | DIASTOLIC BLOOD PRESSURE: 82 MMHG | HEART RATE: 82 BPM

## 2022-04-05 PROCEDURE — 36415 COLL VENOUS BLD VENIPUNCTURE: CPT

## 2022-04-05 PROCEDURE — 93000 ELECTROCARDIOGRAM COMPLETE: CPT

## 2022-04-05 PROCEDURE — 99214 OFFICE O/P EST MOD 30 MIN: CPT

## 2022-04-05 RX ORDER — DILTIAZEM HYDROCHLORIDE 120 MG/1
120 CAPSULE, EXTENDED RELEASE ORAL
Qty: 90 | Refills: 2 | Status: DISCONTINUED | COMMUNITY
Start: 2022-01-03 | End: 2022-04-05

## 2022-04-05 NOTE — ASSESSMENT
[FreeTextEntry1] : - afib stop diltiazem start amiodarone 200 mg daily on eliquis 5 mg bid \par - HTN controlled for his age continue HCTZ 25 mg daily and lisinopril 40 mg daily \par - CAD on atorvastatin 40 mg daily \par - check labs\par - fu in three months \par

## 2022-04-05 NOTE — HISTORY OF PRESENT ILLNESS
[FreeTextEntry1] : 86 M HTN Hypothyroid TIA Prostate cancer CAD  s/p stent to mid LAD 7/2019 RCA aneurysm normal LVEF Atrial fibrillation on Eliquis MERVIN not very compliant with sleep machine,  TIA with aphasia \par \par here for fu of ED presentation for afib that lasted for two hours diltiazem was increased to 180 mg daily he cut back on ETOH he also made troponin elevation\par \par \par ecg NSR

## 2022-04-06 LAB
ALBUMIN SERPL ELPH-MCNC: 4.7 G/DL
ALP BLD-CCNC: 85 U/L
ALT SERPL-CCNC: 33 U/L
ANION GAP SERPL CALC-SCNC: 16 MMOL/L
AST SERPL-CCNC: 53 U/L
BILIRUB SERPL-MCNC: 1.4 MG/DL
BUN SERPL-MCNC: 13 MG/DL
CALCIUM SERPL-MCNC: 9.7 MG/DL
CHLORIDE SERPL-SCNC: 107 MMOL/L
CHOLEST SERPL-MCNC: 125 MG/DL
CO2 SERPL-SCNC: 21 MMOL/L
CREAT SERPL-MCNC: 1.28 MG/DL
EGFR: 55 ML/MIN/1.73M2
GLUCOSE SERPL-MCNC: 108 MG/DL
HDLC SERPL-MCNC: 41 MG/DL
LDLC SERPL CALC-MCNC: 47 MG/DL
NONHDLC SERPL-MCNC: 84 MG/DL
NT-PROBNP SERPL-MCNC: 283 PG/ML
POTASSIUM SERPL-SCNC: 3.8 MMOL/L
PROT SERPL-MCNC: 7.2 G/DL
SODIUM SERPL-SCNC: 144 MMOL/L
T4 FREE SERPL-MCNC: 1.3 NG/DL
TRIGL SERPL-MCNC: 187 MG/DL
TSH SERPL-ACNC: 1.94 UIU/ML

## 2022-04-07 LAB
ESTIMATED AVERAGE GLUCOSE: 105 MG/DL
HBA1C MFR BLD HPLC: 5.3 %

## 2022-04-13 ENCOUNTER — APPOINTMENT (OUTPATIENT)
Dept: HEART AND VASCULAR | Facility: CLINIC | Age: 87
End: 2022-04-13
Payer: MEDICARE

## 2022-04-13 VITALS
WEIGHT: 153 LBS | HEART RATE: 62 BPM | SYSTOLIC BLOOD PRESSURE: 129 MMHG | DIASTOLIC BLOOD PRESSURE: 70 MMHG | BODY MASS INDEX: 23.73 KG/M2 | HEIGHT: 67.5 IN

## 2022-04-13 PROCEDURE — 93280 PM DEVICE PROGR EVAL DUAL: CPT

## 2022-04-18 NOTE — HISTORY OF PRESENT ILLNESS
[FreeTextEntry1] : 86 year old male with CAD s/p stent, HLD, HTN, MERVIN (not on CPAP), history of prostate cancer, TIA, hypothyroidism and paroxysmal atrial fibrillation with tachy-ondina s/p pacemaker implant. \par \par He states he has a long standing history of atrial fibrillation but it has always been paroxysmal and never needed a cardioversion or been on an antiarrhythmic medication.  \par \par He is doing well since his last visit.  No device related complaints.  No chest pain, SOB< syncope, near syncope, or edema.  He was recently admitted to [facility] with palpitations and was placed on amiodarone.  He states he has about 3-4 episodes of palpitations a year.  No precipitating or alleviating factors. He is maintained on ELiquis. \par \par

## 2022-04-18 NOTE — DISCUSSION/SUMMARY
[Pacemaker Function Normal] : normal pacemaker function [FreeTextEntry1] : 86 year old male with CAD s/p stent, HLD, HTN, MERVIN (not on CPAP), history of prostate cancer, TIA, hypothyroidism and paroxysmal atrial fibrillation with tachy-ondina s/p pacemaker implant.   PAcemaker interrogation reveals normal function and all measured data is within normal limits.  Since his last visit he had one symptomatic episode of afib and went to an OSH now on amiodarone.  He needs yearly eye exam, pulm assessment and TSH/LFTs. He is maintained on Eliquis.  He will follow up in 6 months or sooner if needed and knows to call with questions or concerns.

## 2022-04-18 NOTE — PHYSICAL EXAM
[General Appearance - Well Developed] : well developed [Normal Appearance] : normal appearance [Well Groomed] : well groomed [General Appearance - Well Nourished] : well nourished [No Deformities] : no deformities [General Appearance - In No Acute Distress] : no acute distress [Heart Sounds] : normal S1 and S2 [Heart Rate And Rhythm] : heart rate and rhythm were normal [] : no respiratory distress [Respiration, Rhythm And Depth] : normal respiratory rhythm and effort [Exaggerated Use Of Accessory Muscles For Inspiration] : no accessory muscle use [Auscultation Breath Sounds / Voice Sounds] : lungs were clear to auscultation bilaterally [Clean] : clean [Dry] : dry [Well-Healed] : well-healed [Normal Conjunctiva] : the conjunctiva exhibited no abnormalities [Normal Oral Mucosa] : normal oral mucosa [Normal Jugular Venous V Waves Present] : normal jugular venous V waves present [Abnormal Walk] : normal gait [Skin Color & Pigmentation] : normal skin color and pigmentation [Oriented To Time, Place, And Person] : oriented to person, place, and time [Impaired Insight] : insight and judgment were intact [Affect] : the affect was normal [Mood] : the mood was normal [No Anxiety] : not feeling anxious [Palpable Crepitus] : no palpable crepitus [Bleeding] : no active bleeding [Foul Odor] : no foul smell [Purulent Drainage] : no purulent drainage [Serosanguineous Drainage] : no serosanquineous drainage [Serous Drainage] : no serous drainage [Erythema] : not erythematous [Warm] : not warm [Tender] : not tender [Indurated] : not indurated [Fluctuant] : not fluctuant

## 2022-04-18 NOTE — PROCEDURE
[No] : not [NSR] : normal sinus rhythm [Pacemaker] : pacemaker [DDDR] : DDDR [Threshold Testing Performed] : Threshold testing was performed [Lead Imp:  ___ohms] : lead impedance was [unfilled] ohms [Sensing Amplitude ___mv] : sensing amplitude was [unfilled] mv [___V @] : [unfilled] V [___ ms] : [unfilled] ms [None] : none [de-identified] : [facility]  [de-identified] : [employer] [de-identified] : 143150 [de-identified] : 50/130 [de-identified] : 11/2020 [de-identified] : 12.5 years  [de-identified] : AP 55%\par  <1%\par afib burden <1% - 5.6 hours of Afib (april 1/2022

## 2022-05-06 ENCOUNTER — RX RENEWAL (OUTPATIENT)
Age: 87
End: 2022-05-06

## 2022-05-25 NOTE — ED ADULT TRIAGE NOTE - NS ED NURSE AMBULANCES
MSW called  [facility] to discuss a new pt appt for this pt, however while going through the process they informed me that they do not accept her insurance  MSW then did a provider search for her [address] and found one local surgeon who accepts her insurance, [id number]   MSW called this office and they confirmed that they do accept her insurance  They have open appts tomorrow or Friday, I had them colten pt for 5/26 at 2pm     Called pt to review the above with her  She was incredibly grateful for the help and shocked that she was able to get in to be seen so quickly  She says that she knows where this office is, and she will take the bus and only have to walk a block to get there  I advised her to be there early if possible to do new pt paperwork, as well as to bring an insurance and ID card  She thanked me multiple times for getting her in so soon, which I assured her was just luck and nothing else  She agreed to let the Hem Onc office know how her appt goes tomorrow, so she can then be scheduled for infusion when her dental situation is resolved  No other needs or concerns at this time 
Other

## 2022-06-08 ENCOUNTER — RX RENEWAL (OUTPATIENT)
Age: 87
End: 2022-06-08

## 2022-06-14 ENCOUNTER — APPOINTMENT (OUTPATIENT)
Dept: HEART AND VASCULAR | Facility: CLINIC | Age: 87
End: 2022-06-14
Payer: MEDICARE

## 2022-06-14 ENCOUNTER — NON-APPOINTMENT (OUTPATIENT)
Age: 87
End: 2022-06-14

## 2022-06-14 PROCEDURE — 93296 REM INTERROG EVL PM/IDS: CPT

## 2022-06-14 PROCEDURE — 93294 REM INTERROG EVL PM/LDLS PM: CPT

## 2022-06-15 ENCOUNTER — NON-APPOINTMENT (OUTPATIENT)
Age: 87
End: 2022-06-15

## 2022-06-15 ENCOUNTER — APPOINTMENT (OUTPATIENT)
Dept: NEUROLOGY | Facility: CLINIC | Age: 87
End: 2022-06-15
Payer: MEDICARE

## 2022-06-15 VITALS
HEIGHT: 67.5 IN | OXYGEN SATURATION: 96 % | BODY MASS INDEX: 23.73 KG/M2 | TEMPERATURE: 98 F | DIASTOLIC BLOOD PRESSURE: 66 MMHG | SYSTOLIC BLOOD PRESSURE: 145 MMHG | WEIGHT: 153 LBS | HEART RATE: 60 BPM

## 2022-06-15 PROCEDURE — 99214 OFFICE O/P EST MOD 30 MIN: CPT

## 2022-06-15 RX ORDER — KETOCONAZOLE 20 MG/G
2 CREAM TOPICAL
Qty: 15 | Refills: 0 | Status: DISCONTINUED | COMMUNITY
Start: 2020-06-26 | End: 2022-06-15

## 2022-06-15 NOTE — HISTORY OF PRESENT ILLNESS
[FreeTextEntry1] : The patient is a very pleasant 86-year-old gentleman with a history of CAD status post PCI, s/p pacemaker. atrial fibrillation for which he is on Eliquis, MERVIN, prostate cancer (in remission), hypothyroidism, HTN, and transient episode of aphasia in mid 2021 concerning for TIA. He is here today for follow-up after experiencing two transient episodes of vertigo-like symptoms/dysequilibrium\par \par The patient states over the last 2 months, he has had 2 separate episodes that have occurred without warning of a disequilibrium but not kayla vertigo.  He states that during this episode he gets nauseated and has difficulty walking.  He denies room spinning but does feel as if his vision becomes blurred as if objects are moving quickly past him.  Episodes can last several minutes if not longer.  He has no other associated symptoms apart from "disorientation."  There are no clear triggers he is unable to abort the sensation and it seems to pass on its own.  Otherwise, he has been doing well and denies any worrisome symptoms suggestive of TIA/stroke.  He has been compliant with his medications, including Eliquis.\par

## 2022-06-15 NOTE — PHYSICAL EXAM
[FreeTextEntry1] : \par Alert. Fully oriented. Speech and language are intact. Cranial nerves II-XII are intact. Motor exam reveals intact strength with individual muscle testing in bilateral upper and lower extremities. Tone is normal. Finger-to-nose and heel-to-shin are intact. Rapid alternating movements are normal in the upper and lower extremities. Gait is normal. He is able to walk on heels, toes.

## 2022-06-15 NOTE — ASSESSMENT
[FreeTextEntry1] : The patient is a very pleasant 86-year-old gentleman with a history of CAD status post PCI, s/p pacemaker. atrial fibrillation for which he is on Eliquis, MERVIN, prostate cancer (in remission), hypothyroidism, HTN, and transient episode of aphasia in mid 2021 concerning for TIA. He is here today for follow-up after experiencing two transient episodes of vertigo-like symptoms/dysequilibrium. Symptoms are likely peripheral in etiology. Patient is concerned about these spells and given his history of multiple stroke risk factors, it is not unreasonable to pursue additoinal testing. Plan for MRA head/neck with additional brain MRI symptoms.  For now, he will continue current medication regimen.  F/U in 6 months. Will message/call patient with results.

## 2022-06-22 ENCOUNTER — APPOINTMENT (OUTPATIENT)
Dept: ORTHOPEDIC SURGERY | Facility: CLINIC | Age: 87
End: 2022-06-22
Payer: MEDICARE

## 2022-06-24 ENCOUNTER — APPOINTMENT (OUTPATIENT)
Dept: ORTHOPEDIC SURGERY | Facility: CLINIC | Age: 87
End: 2022-06-24
Payer: MEDICARE

## 2022-06-24 VITALS — WEIGHT: 153 LBS | BODY MASS INDEX: 23.73 KG/M2 | HEIGHT: 67.5 IN

## 2022-06-24 DIAGNOSIS — Z78.9 OTHER SPECIFIED HEALTH STATUS: ICD-10-CM

## 2022-06-24 DIAGNOSIS — Z80.42 FAMILY HISTORY OF MALIGNANT NEOPLASM OF PROSTATE: ICD-10-CM

## 2022-06-24 DIAGNOSIS — Z86.39 PERSONAL HISTORY OF OTHER ENDOCRINE, NUTRITIONAL AND METABOLIC DISEASE: ICD-10-CM

## 2022-06-24 DIAGNOSIS — Z86.79 PERSONAL HISTORY OF OTHER DISEASES OF THE CIRCULATORY SYSTEM: ICD-10-CM

## 2022-06-24 DIAGNOSIS — Z86.73 PERSONAL HISTORY OF TRANSIENT ISCHEMIC ATTACK (TIA), AND CEREBRAL INFARCTION W/OUT RESIDUAL DEFICITS: ICD-10-CM

## 2022-06-24 DIAGNOSIS — Z85.46 PERSONAL HISTORY OF MALIGNANT NEOPLASM OF PROSTATE: ICD-10-CM

## 2022-06-24 PROCEDURE — 73564 X-RAY EXAM KNEE 4 OR MORE: CPT | Mod: RT

## 2022-06-24 PROCEDURE — 99203 OFFICE O/P NEW LOW 30 MIN: CPT

## 2022-06-24 NOTE — PHYSICAL EXAM
[Slightly Antalgic] : slightly antalgic [de-identified] : RIGHT knee\par Nonantalgic gait.\par Knee range of motion is from 0 to 130 degrees flexion without significant pain.\par Tender mildly patella facets.\par Intact extensor mechanism.\par Negative Marley.  Ia Lachman.  Negative anterior posterior drawer is all normal varus valgus laxity.\par Normal neurovascular exam.\par No edema, ecchymoses, erythema. [de-identified] : \par \par X-rays taken today of RIGHT knee weightbearing 4 views show mild joint space narrowing/ degeneration. K-L 1-2. No fractures/ acute lesions

## 2022-06-24 NOTE — ASSESSMENT
[FreeTextEntry1] : 87 year old male with RIGHT knee pain following a buckling injury while walking 3 months ago.  He may have had a chondral or meniscus injury that occurred.  His extensor tendons and ligaments all feel intact are stable.\par He has gotten mostly better but has some mild residual discomfort or weakness.  I recommended physical therapy to work on strengthening and was given a home exercise program.  He can use heat and ice.  Tylenol if needed for pain.\par Follow-up if his knee is not fully better in the next 6 weeks or as needed at any time.

## 2022-06-24 NOTE — HISTORY OF PRESENT ILLNESS
[de-identified] : Mr. Starks is a 87 year old male who comes in for evaluation for RIGHT knee pain that started about 3 months ago. He was walking and felt like his knee buckled. He didn’t twist or step on anything but started having pain. He couldn’t walk for a few days and had some swelling. He used an ace wrap and then got a knee sleeve which he has been wearing. He describes pain as achy and throbbing, localized over medial knee. He can only walk about 2-3 blocks before having pain. He rates it at a 2/10. He is not taking any pain medications. He has no history of prior knee problems. He does not use a cane. He feels 85% better now\par He was referred by Dr. Leonard

## 2022-06-30 ENCOUNTER — APPOINTMENT (OUTPATIENT)
Dept: HEART AND VASCULAR | Facility: CLINIC | Age: 87
End: 2022-06-30

## 2022-06-30 ENCOUNTER — APPOINTMENT (OUTPATIENT)
Dept: MRI IMAGING | Facility: HOSPITAL | Age: 87
End: 2022-06-30

## 2022-06-30 ENCOUNTER — OUTPATIENT (OUTPATIENT)
Dept: OUTPATIENT SERVICES | Facility: HOSPITAL | Age: 87
LOS: 1 days | End: 2022-06-30
Payer: MEDICARE

## 2022-06-30 DIAGNOSIS — Z90.49 ACQUIRED ABSENCE OF OTHER SPECIFIED PARTS OF DIGESTIVE TRACT: Chronic | ICD-10-CM

## 2022-06-30 PROCEDURE — 70544 MR ANGIOGRAPHY HEAD W/O DYE: CPT | Mod: 26

## 2022-06-30 PROCEDURE — 70544 MR ANGIOGRAPHY HEAD W/O DYE: CPT

## 2022-06-30 PROCEDURE — 70547 MR ANGIOGRAPHY NECK W/O DYE: CPT

## 2022-06-30 PROCEDURE — 93280 PM DEVICE PROGR EVAL DUAL: CPT

## 2022-06-30 PROCEDURE — 70547 MR ANGIOGRAPHY NECK W/O DYE: CPT | Mod: 26

## 2022-07-05 ENCOUNTER — TRANSCRIPTION ENCOUNTER (OUTPATIENT)
Age: 87
End: 2022-07-05

## 2022-07-07 DIAGNOSIS — R42 DIZZINESS AND GIDDINESS: ICD-10-CM

## 2022-07-07 DIAGNOSIS — R26.89 OTHER ABNORMALITIES OF GAIT AND MOBILITY: ICD-10-CM

## 2022-07-07 DIAGNOSIS — G45.9 TRANSIENT CEREBRAL ISCHEMIC ATTACK, UNSPECIFIED: ICD-10-CM

## 2022-07-12 ENCOUNTER — NON-APPOINTMENT (OUTPATIENT)
Age: 87
End: 2022-07-12

## 2022-07-12 ENCOUNTER — APPOINTMENT (OUTPATIENT)
Dept: HEART AND VASCULAR | Facility: CLINIC | Age: 87
End: 2022-07-12

## 2022-07-12 VITALS
DIASTOLIC BLOOD PRESSURE: 76 MMHG | OXYGEN SATURATION: 97 % | TEMPERATURE: 96.3 F | SYSTOLIC BLOOD PRESSURE: 151 MMHG | HEART RATE: 60 BPM | WEIGHT: 153 LBS | BODY MASS INDEX: 23.73 KG/M2 | HEIGHT: 67.5 IN

## 2022-07-12 DIAGNOSIS — I10 ESSENTIAL (PRIMARY) HYPERTENSION: ICD-10-CM

## 2022-07-12 PROCEDURE — 93000 ELECTROCARDIOGRAM COMPLETE: CPT

## 2022-07-12 PROCEDURE — 36415 COLL VENOUS BLD VENIPUNCTURE: CPT

## 2022-07-12 PROCEDURE — 99214 OFFICE O/P EST MOD 30 MIN: CPT

## 2022-07-12 RX ORDER — DILTIAZEM HYDROCHLORIDE 180 MG/1
180 CAPSULE, EXTENDED RELEASE ORAL
Qty: 30 | Refills: 0 | Status: DISCONTINUED | COMMUNITY
Start: 2022-04-04 | End: 2022-07-12

## 2022-07-12 RX ORDER — HYDROCHLOROTHIAZIDE 12.5 MG/1
12.5 TABLET ORAL
Qty: 30 | Refills: 0 | Status: DISCONTINUED | COMMUNITY
Start: 2022-04-04 | End: 2022-07-12

## 2022-07-12 NOTE — HISTORY OF PRESENT ILLNESS
[FreeTextEntry1] : 87 M HTN Hypothyroid TIA Prostate cancer CAD  s/p stent to mid LAD 7/2019 RCA aneurysm normal LVEF Atrial fibrillation on Eliquis MERVIN not very compliant with sleep machine,  TIA with aphasia \par \par he feels much better on amiodarone no further symptoms of afib \par \par \par ecg atrial paced NSR 7/12/2022

## 2022-07-12 NOTE — ASSESSMENT
[FreeTextEntry1] : - afib on eliquis amiodarone at [facility]\par - HTN elevated HCTZ 25 mg daily and lisinopril 40 mg daily resume nifedipine 30 mg daily \par - CAD on atorvastatin 40 mg daily \par - check labs\par - fu in three months \par

## 2022-07-13 ENCOUNTER — APPOINTMENT (OUTPATIENT)
Dept: HEART AND VASCULAR | Facility: CLINIC | Age: 87
End: 2022-07-13

## 2022-07-13 LAB
ALBUMIN SERPL ELPH-MCNC: 4.8 G/DL
ALP BLD-CCNC: 73 U/L
ALT SERPL-CCNC: 23 U/L
ANION GAP SERPL CALC-SCNC: 12 MMOL/L
AST SERPL-CCNC: 31 U/L
BASOPHILS # BLD AUTO: 0.03 K/UL
BASOPHILS NFR BLD AUTO: 0.4 %
BILIRUB SERPL-MCNC: 1.5 MG/DL
BUN SERPL-MCNC: 14 MG/DL
CALCIUM SERPL-MCNC: 9.5 MG/DL
CHLORIDE SERPL-SCNC: 106 MMOL/L
CHOLEST SERPL-MCNC: 129 MG/DL
CO2 SERPL-SCNC: 26 MMOL/L
CREAT SERPL-MCNC: 1.13 MG/DL
CREAT SPEC-SCNC: 181 MG/DL
EGFR: 64 ML/MIN/1.73M2
EOSINOPHIL # BLD AUTO: 0.19 K/UL
EOSINOPHIL NFR BLD AUTO: 2.4 %
ESTIMATED AVERAGE GLUCOSE: 103 MG/DL
GLUCOSE SERPL-MCNC: 93 MG/DL
HBA1C MFR BLD HPLC: 5.2 %
HCT VFR BLD CALC: 44.9 %
HDLC SERPL-MCNC: 45 MG/DL
HGB BLD-MCNC: 15 G/DL
IMM GRANULOCYTES NFR BLD AUTO: 0.5 %
LDLC SERPL CALC-MCNC: 55 MG/DL
LYMPHOCYTES # BLD AUTO: 0.65 K/UL
LYMPHOCYTES NFR BLD AUTO: 8.3 %
MAN DIFF?: NORMAL
MCHC RBC-ENTMCNC: 33.3 PG
MCHC RBC-ENTMCNC: 33.4 GM/DL
MCV RBC AUTO: 99.8 FL
MICROALBUMIN 24H UR DL<=1MG/L-MCNC: <1.2 MG/DL
MICROALBUMIN/CREAT 24H UR-RTO: NORMAL MG/G
MONOCYTES # BLD AUTO: 0.64 K/UL
MONOCYTES NFR BLD AUTO: 8.2 %
NEUTROPHILS # BLD AUTO: 6.27 K/UL
NEUTROPHILS NFR BLD AUTO: 80.2 %
NONHDLC SERPL-MCNC: 85 MG/DL
PLATELET # BLD AUTO: 155 K/UL
POTASSIUM SERPL-SCNC: 3.6 MMOL/L
PROT SERPL-MCNC: 7.2 G/DL
RBC # BLD: 4.5 M/UL
RBC # FLD: 14.2 %
SODIUM SERPL-SCNC: 144 MMOL/L
T4 FREE SERPL-MCNC: 1.9 NG/DL
TRIGL SERPL-MCNC: 146 MG/DL
TSH SERPL-ACNC: 1.5 UIU/ML
WBC # FLD AUTO: 7.82 K/UL

## 2022-07-29 NOTE — PROCEDURE
[No] : not [NSR] : normal sinus rhythm [Pacemaker] : pacemaker [DDDR] : DDDR [Threshold Testing Performed] : Threshold testing was performed [Lead Imp:  ___ohms] : lead impedance was [unfilled] ohms [Sensing Amplitude ___mv] : sensing amplitude was [unfilled] mv [___V @] : [unfilled] V [___ ms] : [unfilled] ms [None] : none [de-identified] : [facility]  [de-identified] : [facility] [de-identified] : [id number] [de-identified] : 11/2020 [de-identified] : 50/130 [de-identified] : 12.5 years  [de-identified] : AP 55%\par  <1%\par afib burden <1% - 5.6 hours of Afib (april 1/2022

## 2022-07-29 NOTE — DISCUSSION/SUMMARY
[Pacemaker Function Normal] : normal pacemaker function [FreeTextEntry1] : 86 year old male with CAD s/p stent, HLD, HTN, MERVIN (not on CPAP), history of prostate cancer, TIA, hypothyroidism and paroxysmal atrial fibrillation with tachy-ondina s/p pacemaker implant.  Patient presents fo scheduled MRI .\par PPM was programmed too MRI safe mode AOO 60. with atrial amplitude 5.0 V @ 1.0 ms \par \par

## 2022-08-13 NOTE — PATIENT PROFILE ADULT - ABILITY TO HEAR (WITH HEARING AID OR HEARING APPLIANCE IF NORMALLY USED):
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 Has hearing aid for L ear. Sore throat

## 2022-08-15 ENCOUNTER — NON-APPOINTMENT (OUTPATIENT)
Age: 87
End: 2022-08-15

## 2022-08-15 ENCOUNTER — APPOINTMENT (OUTPATIENT)
Dept: NEUROLOGY | Facility: CLINIC | Age: 87
End: 2022-08-15

## 2022-08-15 VITALS
DIASTOLIC BLOOD PRESSURE: 56 MMHG | HEIGHT: 67.5 IN | HEART RATE: 57 BPM | SYSTOLIC BLOOD PRESSURE: 101 MMHG | TEMPERATURE: 98.7 F | OXYGEN SATURATION: 95 % | BODY MASS INDEX: 23.42 KG/M2 | WEIGHT: 151 LBS

## 2022-08-15 PROCEDURE — 99213 OFFICE O/P EST LOW 20 MIN: CPT

## 2022-08-15 NOTE — ASSESSMENT
[FreeTextEntry1] : The patient is a very pleasant 86-year-old gentleman with a history of CAD status post PCI, s/p pacemaker. atrial fibrillation for which he is on Eliquis, MERVIN, prostate cancer (in remission), hypothyroidism, HTN, and transient episode of aphasia in mid 2021 concerning for TIA.  He is doing well from a stroke perspective and should conitnue Eliquis, statin therapy.  I have recommended he try vestibular therapy given his intermittent imbalance.  He plans to get B12 drawn with his labs next week with his PCP. He will send me the results. RTC in 3 months, sooner if symptoms worsen.

## 2022-08-15 NOTE — PHYSICAL EXAM
[FreeTextEntry1] : Alert. Fully oriented. Speech/language intact. CN grossly intact. No nystagmus. Strength intact. Sensation intact to light touch. Slight imbalance with turns. FTN intact bilaterally.  Slight kyphosis. Gait is of normal speed, stride length, width.

## 2022-08-15 NOTE — HISTORY OF PRESENT ILLNESS
[FreeTextEntry1] : The patient is a very pleasant 86-year-old gentleman with a history of CAD status post PCI, s/p pacemaker. atrial fibrillation for which he is on Eliquis, MERVIN, prostate cancer (in remission), hypothyroidism, HTN, and transient episode of aphasia in mid 2021 concerning for TIA. During his last visit, he had complained of dysequilibrium; MRI/MRA head/neck were unremarkable.\par \par The patient has been doing well and denies any worrisome symptoms suggestive of TIA/stroke. His only issue is intermittent imbalance which he relates to peripheral vertigo he has had in the past.  PT is helping but he still has episodes of unsteadiness. He takes monthly B12 injections. He has been compliant with his medications, including Eliquis and statin therapy. Most recent lipid panel: LDL 55, non-HDL 85, total 129, TGs 146. A1c 5.2%. TSH WNL, \par

## 2022-08-17 ENCOUNTER — FORM ENCOUNTER (OUTPATIENT)
Age: 87
End: 2022-08-17

## 2022-08-19 ENCOUNTER — TRANSCRIPTION ENCOUNTER (OUTPATIENT)
Age: 87
End: 2022-08-19

## 2022-09-16 ENCOUNTER — APPOINTMENT (OUTPATIENT)
Dept: ORTHOPEDIC SURGERY | Facility: CLINIC | Age: 87
End: 2022-09-16

## 2022-09-16 DIAGNOSIS — M25.561 PAIN IN RIGHT KNEE: ICD-10-CM

## 2022-09-16 DIAGNOSIS — M94.261 CHONDROMALACIA, RIGHT KNEE: ICD-10-CM

## 2022-09-16 PROCEDURE — 99213 OFFICE O/P EST LOW 20 MIN: CPT

## 2022-09-16 NOTE — ASSESSMENT
[FreeTextEntry1] : 87 year old male with RIGHT knee mild arthritis/chondromalacia.  His knee feels stable on exam.  There is no swelling and there is good motion.  He has some mild residual discomfort but overall seems better.  Some of his vestibular therapy exercises involves lunging which aggravates his knee so he should avoid or modify those exercises.  There is other ways to work on balance and strengthening that would be better.\par We talked about hyaluronic acid injections as an option or steroid injections.  We decided that his pain is not bad enough right now to warrant it but if it hurts more than we certainly could try. Tylenol prn

## 2022-09-16 NOTE — HISTORY OF PRESENT ILLNESS
[de-identified] : Mr. Starks is a 87 year old male who comes in for follow up for RIGHT knee pain that started about 5-6 months ago. He was walking and felt like his knee buckled. He didn’t twist or step on anything but started having pain. Today he comes in for a check up. He feels he is about 98% better. He still gets occasional twinges of pain. Walking can cause discomfort at times. He shuffles when he walks.  He rates his pain at a 1/10. He is not taking any pain medications.\par He did physical therapy and that went well but now he is doing vestibular therapy because he balance issues.

## 2022-09-16 NOTE — PHYSICAL EXAM
[LE] : Sensory: Intact in bilateral lower extremities [Normal RLE] : Right Lower Extremity: No scars, rashes, lesions, ulcers, skin intact [Normal LLE] : Left Lower Extremity: No scars, rashes, lesions, ulcers, skin intact [Normal Touch] : sensation intact for touch [Normal] : Gait: normal [de-identified] : RIGHT knee\par Nonantalgic gait.\par Knee range of motion is from 0 to 130 degrees flexion without significant pain.\par Tender mildly patella facets.  Mildly tender medial joint line\par Intact extensor mechanism.\par Negative Marley.  Ia Lachman.  Negative anterior posterior drawer is all normal varus valgus laxity.\par Normal neurovascular exam.\par No edema, ecchymoses, erythema. [de-identified] : \par \par X-rays taken 6/24/22 of RIGHT knee weightbearing 4 views showed mild joint space narrowing/degeneration. K-L 1-2. No fractures/acute lesions.

## 2022-10-18 ENCOUNTER — APPOINTMENT (OUTPATIENT)
Dept: HEART AND VASCULAR | Facility: CLINIC | Age: 87
End: 2022-10-18

## 2022-10-18 ENCOUNTER — NON-APPOINTMENT (OUTPATIENT)
Age: 87
End: 2022-10-18

## 2022-10-18 PROCEDURE — 93294 REM INTERROG EVL PM/LDLS PM: CPT

## 2022-10-18 PROCEDURE — 93296 REM INTERROG EVL PM/IDS: CPT

## 2022-11-22 ENCOUNTER — APPOINTMENT (OUTPATIENT)
Dept: NEUROLOGY | Facility: CLINIC | Age: 87
End: 2022-11-22

## 2022-11-30 ENCOUNTER — LABORATORY RESULT (OUTPATIENT)
Age: 87
End: 2022-11-30

## 2022-11-30 ENCOUNTER — APPOINTMENT (OUTPATIENT)
Dept: HEART AND VASCULAR | Facility: CLINIC | Age: 87
End: 2022-11-30

## 2022-11-30 ENCOUNTER — NON-APPOINTMENT (OUTPATIENT)
Age: 87
End: 2022-11-30

## 2022-11-30 VITALS
TEMPERATURE: 97.9 F | OXYGEN SATURATION: 97 % | BODY MASS INDEX: 22.18 KG/M2 | DIASTOLIC BLOOD PRESSURE: 54 MMHG | WEIGHT: 143 LBS | HEIGHT: 67.5 IN | HEART RATE: 50 BPM | SYSTOLIC BLOOD PRESSURE: 98 MMHG

## 2022-11-30 PROCEDURE — 93000 ELECTROCARDIOGRAM COMPLETE: CPT

## 2022-11-30 PROCEDURE — 36415 COLL VENOUS BLD VENIPUNCTURE: CPT

## 2022-11-30 PROCEDURE — 99214 OFFICE O/P EST MOD 30 MIN: CPT | Mod: 25

## 2022-11-30 RX ORDER — CLINDAMYCIN HYDROCHLORIDE 300 MG/1
300 CAPSULE ORAL
Qty: 12 | Refills: 0 | Status: DISCONTINUED | COMMUNITY
Start: 2022-09-27 | End: 2022-11-30

## 2022-11-30 RX ORDER — HYDRALAZINE HYDROCHLORIDE 50 MG/1
50 TABLET ORAL
Qty: 90 | Refills: 0 | Status: DISCONTINUED | COMMUNITY
Start: 2022-10-03 | End: 2022-11-30

## 2022-11-30 RX ORDER — HYDRALAZINE HYDROCHLORIDE 50 MG/1
50 TABLET ORAL EVERY 8 HOURS
Qty: 270 | Refills: 1 | Status: DISCONTINUED | COMMUNITY
End: 2022-11-30

## 2022-11-30 RX ORDER — NIFEDIPINE 30 MG/1
30 TABLET, EXTENDED RELEASE ORAL
Qty: 90 | Refills: 3 | Status: DISCONTINUED | COMMUNITY
Start: 2022-07-12 | End: 2022-11-30

## 2022-11-30 NOTE — ASSESSMENT
[FreeTextEntry1] : - afib on eliquis amiodarone at [facility]\par - sob i ambulated him in the office no desaturation repeat echo i may need to do ct chest given he is on amiodarone\par - HTN bp is low stop hydralazine and nifedipine bp on recheck was in the 80's \par - CAD on atorvastatin 40 mg daily \par - check labs\par - fu next week \par

## 2022-11-30 NOTE — HISTORY OF PRESENT ILLNESS
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

## 2022-12-01 LAB
ALBUMIN SERPL ELPH-MCNC: 4.9 G/DL
ALP BLD-CCNC: 68 U/L
ALT SERPL-CCNC: 21 U/L
ANION GAP SERPL CALC-SCNC: 14 MMOL/L
AST SERPL-CCNC: 32 U/L
BILIRUB SERPL-MCNC: 1 MG/DL
BUN SERPL-MCNC: 15 MG/DL
CALCIUM SERPL-MCNC: 9.5 MG/DL
CHLORIDE SERPL-SCNC: 104 MMOL/L
CHOLEST SERPL-MCNC: 125 MG/DL
CO2 SERPL-SCNC: 24 MMOL/L
CREAT SERPL-MCNC: 1.13 MG/DL
EGFR: 64 ML/MIN/1.73M2
ESTIMATED AVERAGE GLUCOSE: 94 MG/DL
GLUCOSE SERPL-MCNC: 88 MG/DL
HBA1C MFR BLD HPLC: 4.9 %
HDLC SERPL-MCNC: 41 MG/DL
LDLC SERPL CALC-MCNC: 60 MG/DL
NONHDLC SERPL-MCNC: 84 MG/DL
NT-PROBNP SERPL-MCNC: 210 PG/ML
POTASSIUM SERPL-SCNC: 3.4 MMOL/L
PROT SERPL-MCNC: 7.1 G/DL
SODIUM SERPL-SCNC: 142 MMOL/L
TRIGL SERPL-MCNC: 116 MG/DL

## 2022-12-01 RX ORDER — CLOBETASOL PROPIONATE 0.5 MG/ML
0.05 SOLUTION TOPICAL
Qty: 50 | Refills: 0 | Status: DISCONTINUED | COMMUNITY
Start: 2022-06-02 | End: 2022-12-01

## 2022-12-01 RX ORDER — CHOLECALCIFEROL (VITAMIN D3) 50 MCG
50 MCG TABLET ORAL
Qty: 60 | Refills: 1 | Status: DISCONTINUED | COMMUNITY
End: 2022-12-01

## 2022-12-01 RX ORDER — FLUOCINONIDE 0.5 MG/ML
0.05 SOLUTION TOPICAL
Qty: 60 | Refills: 0 | Status: DISCONTINUED | COMMUNITY
Start: 2022-02-24 | End: 2022-12-01

## 2022-12-01 RX ORDER — FINASTERIDE 1 MG/1
1 TABLET ORAL DAILY
Refills: 0 | Status: DISCONTINUED | COMMUNITY
Start: 2022-11-30 | End: 2022-12-01

## 2022-12-01 RX ORDER — CYANOCOBALAMIN 1000 UG/ML
1000 INJECTION INTRAMUSCULAR; SUBCUTANEOUS
Qty: 1 | Refills: 0 | Status: DISCONTINUED | COMMUNITY
Start: 2018-08-29 | End: 2022-12-01

## 2022-12-01 RX ORDER — MOMETASONE FUROATE 1 MG/G
0.1 CREAM TOPICAL
Qty: 15 | Refills: 0 | Status: DISCONTINUED | COMMUNITY
Start: 2021-09-29 | End: 2022-12-01

## 2022-12-02 ENCOUNTER — LABORATORY RESULT (OUTPATIENT)
Age: 87
End: 2022-12-02

## 2022-12-02 LAB
ANION GAP SERPL CALC-SCNC: 20 MMOL/L
BASOPHILS # BLD AUTO: 0.08 K/UL
BASOPHILS NFR BLD AUTO: 1 %
BUN SERPL-MCNC: 14 MG/DL
CALCIUM SERPL-MCNC: 9.4 MG/DL
CHLORIDE SERPL-SCNC: 104 MMOL/L
CO2 SERPL-SCNC: 20 MMOL/L
CREAT SERPL-MCNC: 1.14 MG/DL
EGFR: 63 ML/MIN/1.73M2
EOSINOPHIL # BLD AUTO: 0.08 K/UL
EOSINOPHIL NFR BLD AUTO: 1 %
GLUCOSE SERPL-MCNC: 88 MG/DL
HCT VFR BLD CALC: 38.4 %
HGB BLD-MCNC: 12.6 G/DL
LYMPHOCYTES # BLD AUTO: 0.45 K/UL
LYMPHOCYTES NFR BLD AUTO: 6 %
MAGNESIUM SERPL-MCNC: 2.3 MG/DL
MAN DIFF?: NORMAL
MCHC RBC-ENTMCNC: 32.8 GM/DL
MCHC RBC-ENTMCNC: 33 PG
MCV RBC AUTO: 100.5 FL
MONOCYTES # BLD AUTO: 0.44 K/UL
MONOCYTES NFR BLD AUTO: 5.9 %
NEUTROPHILS # BLD AUTO: 6.46 K/UL
NEUTROPHILS NFR BLD AUTO: 86.1 %
PLATELET # BLD AUTO: 153 K/UL
POTASSIUM SERPL-SCNC: 3.5 MMOL/L
RBC # BLD: 3.82 M/UL
RBC # FLD: 14.3 %
SODIUM SERPL-SCNC: 144 MMOL/L
WBC # FLD AUTO: 7.5 K/UL

## 2022-12-06 ENCOUNTER — APPOINTMENT (OUTPATIENT)
Dept: OPHTHALMOLOGY | Facility: CLINIC | Age: 87
End: 2022-12-06

## 2022-12-08 ENCOUNTER — NON-APPOINTMENT (OUTPATIENT)
Age: 87
End: 2022-12-08

## 2022-12-08 ENCOUNTER — APPOINTMENT (OUTPATIENT)
Dept: HEART AND VASCULAR | Facility: CLINIC | Age: 87
End: 2022-12-08

## 2022-12-08 VITALS
DIASTOLIC BLOOD PRESSURE: 70 MMHG | BODY MASS INDEX: 22.08 KG/M2 | WEIGHT: 142.31 LBS | HEART RATE: 50 BPM | OXYGEN SATURATION: 97 % | TEMPERATURE: 97.7 F | HEIGHT: 67.5 IN | SYSTOLIC BLOOD PRESSURE: 140 MMHG

## 2022-12-08 PROCEDURE — 99214 OFFICE O/P EST MOD 30 MIN: CPT | Mod: 25

## 2022-12-08 PROCEDURE — 93000 ELECTROCARDIOGRAM COMPLETE: CPT

## 2022-12-11 NOTE — ASSESSMENT
[FreeTextEntry1] : - afib on eliquis amiodarone at [facility]\par - sob i ambulated him in the office no desaturation repeat echo i may need to do ct chest given he is on amiodarone\par - HTN bp much better today depending on his k will add aldactone\par - CAD on atorvastatin 40 mg daily \par - check labs\par - fu in one month \par

## 2022-12-11 NOTE — HISTORY OF PRESENT ILLNESS
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

## 2022-12-11 NOTE — REASON FOR VISIT

## 2022-12-12 ENCOUNTER — LABORATORY RESULT (OUTPATIENT)
Age: 87
End: 2022-12-12

## 2023-01-01 ENCOUNTER — APPOINTMENT (OUTPATIENT)
Dept: UROLOGY | Facility: CLINIC | Age: 88
End: 2023-01-01

## 2023-01-01 ENCOUNTER — TRANSCRIPTION ENCOUNTER (OUTPATIENT)
Age: 88
End: 2023-01-01

## 2023-01-01 ENCOUNTER — APPOINTMENT (OUTPATIENT)
Dept: HEART AND VASCULAR | Facility: CLINIC | Age: 88
End: 2023-01-01
Payer: MEDICARE

## 2023-01-01 ENCOUNTER — APPOINTMENT (OUTPATIENT)
Dept: HEMATOLOGY ONCOLOGY | Facility: CLINIC | Age: 88
End: 2023-01-01
Payer: MEDICARE

## 2023-01-01 ENCOUNTER — RESULT REVIEW (OUTPATIENT)
Age: 88
End: 2023-01-01

## 2023-01-01 ENCOUNTER — NON-APPOINTMENT (OUTPATIENT)
Age: 88
End: 2023-01-01

## 2023-01-01 ENCOUNTER — APPOINTMENT (OUTPATIENT)
Dept: UROLOGY | Facility: CLINIC | Age: 88
End: 2023-01-01
Payer: MEDICARE

## 2023-01-01 ENCOUNTER — APPOINTMENT (OUTPATIENT)
Dept: CT IMAGING | Facility: HOSPITAL | Age: 88
End: 2023-01-01
Payer: MEDICARE

## 2023-01-01 ENCOUNTER — OUTPATIENT (OUTPATIENT)
Dept: OUTPATIENT SERVICES | Facility: HOSPITAL | Age: 88
LOS: 1 days | End: 2023-01-01
Payer: MEDICARE

## 2023-01-01 ENCOUNTER — APPOINTMENT (OUTPATIENT)
Dept: NEUROLOGY | Facility: CLINIC | Age: 88
End: 2023-01-01

## 2023-01-01 ENCOUNTER — LABORATORY RESULT (OUTPATIENT)
Age: 88
End: 2023-01-01

## 2023-01-01 ENCOUNTER — APPOINTMENT (OUTPATIENT)
Dept: NUCLEAR MEDICINE | Facility: HOSPITAL | Age: 88
End: 2023-01-01

## 2023-01-01 ENCOUNTER — APPOINTMENT (OUTPATIENT)
Dept: OTOLARYNGOLOGY | Facility: CLINIC | Age: 88
End: 2023-01-01

## 2023-01-01 ENCOUNTER — APPOINTMENT (OUTPATIENT)
Dept: OPHTHALMOLOGY | Facility: CLINIC | Age: 88
End: 2023-01-01
Payer: MEDICARE

## 2023-01-01 ENCOUNTER — APPOINTMENT (OUTPATIENT)
Dept: NEPHROLOGY | Facility: CLINIC | Age: 88
End: 2023-01-01
Payer: MEDICARE

## 2023-01-01 ENCOUNTER — RX RENEWAL (OUTPATIENT)
Age: 88
End: 2023-01-01

## 2023-01-01 ENCOUNTER — APPOINTMENT (OUTPATIENT)
Dept: NEUROLOGY | Facility: CLINIC | Age: 88
End: 2023-01-01
Payer: MEDICARE

## 2023-01-01 VITALS
WEIGHT: 138 LBS | TEMPERATURE: 98.8 F | BODY MASS INDEX: 21.66 KG/M2 | SYSTOLIC BLOOD PRESSURE: 138 MMHG | HEART RATE: 56 BPM | HEIGHT: 67 IN | OXYGEN SATURATION: 98 % | DIASTOLIC BLOOD PRESSURE: 70 MMHG

## 2023-01-01 VITALS
SYSTOLIC BLOOD PRESSURE: 130 MMHG | OXYGEN SATURATION: 97 % | HEIGHT: 67 IN | BODY MASS INDEX: 20.88 KG/M2 | HEART RATE: 61 BPM | DIASTOLIC BLOOD PRESSURE: 66 MMHG | WEIGHT: 133 LBS | TEMPERATURE: 97.8 F

## 2023-01-01 VITALS
BODY MASS INDEX: 20.79 KG/M2 | TEMPERATURE: 97.8 F | SYSTOLIC BLOOD PRESSURE: 108 MMHG | OXYGEN SATURATION: 97 % | HEART RATE: 73 BPM | WEIGHT: 134 LBS | HEIGHT: 67.5 IN | DIASTOLIC BLOOD PRESSURE: 64 MMHG

## 2023-01-01 VITALS
DIASTOLIC BLOOD PRESSURE: 64 MMHG | HEART RATE: 70 BPM | HEIGHT: 67 IN | OXYGEN SATURATION: 97 % | WEIGHT: 134 LBS | TEMPERATURE: 97.6 F | SYSTOLIC BLOOD PRESSURE: 120 MMHG | BODY MASS INDEX: 21.03 KG/M2

## 2023-01-01 VITALS
HEIGHT: 67 IN | DIASTOLIC BLOOD PRESSURE: 70 MMHG | WEIGHT: 139 LBS | OXYGEN SATURATION: 96 % | BODY MASS INDEX: 21.82 KG/M2 | SYSTOLIC BLOOD PRESSURE: 150 MMHG | HEART RATE: 60 BPM | TEMPERATURE: 97.7 F

## 2023-01-01 VITALS
TEMPERATURE: 97.9 F | BODY MASS INDEX: 22.65 KG/M2 | SYSTOLIC BLOOD PRESSURE: 127 MMHG | DIASTOLIC BLOOD PRESSURE: 73 MMHG | HEART RATE: 58 BPM | RESPIRATION RATE: 18 BRPM | OXYGEN SATURATION: 96 % | WEIGHT: 146 LBS | HEIGHT: 67.5 IN

## 2023-01-01 VITALS
TEMPERATURE: 98 F | OXYGEN SATURATION: 98 % | SYSTOLIC BLOOD PRESSURE: 178 MMHG | DIASTOLIC BLOOD PRESSURE: 76 MMHG | HEART RATE: 56 BPM

## 2023-01-01 VITALS
HEIGHT: 67 IN | TEMPERATURE: 97 F | BODY MASS INDEX: 21.82 KG/M2 | SYSTOLIC BLOOD PRESSURE: 182 MMHG | OXYGEN SATURATION: 97 % | WEIGHT: 139 LBS | DIASTOLIC BLOOD PRESSURE: 79 MMHG | HEART RATE: 60 BPM

## 2023-01-01 VITALS
BODY MASS INDEX: 21.1 KG/M2 | WEIGHT: 136 LBS | HEIGHT: 67.5 IN | OXYGEN SATURATION: 98 % | TEMPERATURE: 98 F | DIASTOLIC BLOOD PRESSURE: 70 MMHG | SYSTOLIC BLOOD PRESSURE: 120 MMHG | HEART RATE: 53 BPM

## 2023-01-01 VITALS
BODY MASS INDEX: 22.29 KG/M2 | SYSTOLIC BLOOD PRESSURE: 146 MMHG | TEMPERATURE: 98.2 F | WEIGHT: 142 LBS | DIASTOLIC BLOOD PRESSURE: 69 MMHG | HEIGHT: 67 IN | OXYGEN SATURATION: 97 % | HEART RATE: 63 BPM

## 2023-01-01 VITALS
WEIGHT: 145.99 LBS | SYSTOLIC BLOOD PRESSURE: 126 MMHG | HEIGHT: 67 IN | TEMPERATURE: 97.9 F | OXYGEN SATURATION: 97 % | DIASTOLIC BLOOD PRESSURE: 68 MMHG | BODY MASS INDEX: 22.91 KG/M2 | HEART RATE: 54 BPM

## 2023-01-01 VITALS
HEIGHT: 67 IN | BODY MASS INDEX: 20.88 KG/M2 | SYSTOLIC BLOOD PRESSURE: 150 MMHG | DIASTOLIC BLOOD PRESSURE: 80 MMHG | TEMPERATURE: 97.9 F | WEIGHT: 133 LBS | OXYGEN SATURATION: 97 % | HEART RATE: 59 BPM

## 2023-01-01 VITALS — SYSTOLIC BLOOD PRESSURE: 140 MMHG | DIASTOLIC BLOOD PRESSURE: 60 MMHG

## 2023-01-01 VITALS — HEART RATE: 52 BPM | SYSTOLIC BLOOD PRESSURE: 152 MMHG | DIASTOLIC BLOOD PRESSURE: 66 MMHG

## 2023-01-01 VITALS — SYSTOLIC BLOOD PRESSURE: 150 MMHG | DIASTOLIC BLOOD PRESSURE: 80 MMHG

## 2023-01-01 DIAGNOSIS — R06.02 SHORTNESS OF BREATH: ICD-10-CM

## 2023-01-01 DIAGNOSIS — Z90.49 ACQUIRED ABSENCE OF OTHER SPECIFIED PARTS OF DIGESTIVE TRACT: Chronic | ICD-10-CM

## 2023-01-01 DIAGNOSIS — E83.19 OTHER DISORDERS OF IRON METABOLISM: ICD-10-CM

## 2023-01-01 DIAGNOSIS — C61 MALIGNANT NEOPLASM OF PROSTATE: ICD-10-CM

## 2023-01-01 DIAGNOSIS — I48.91 UNSPECIFIED ATRIAL FIBRILLATION: ICD-10-CM

## 2023-01-01 DIAGNOSIS — I10 ESSENTIAL (PRIMARY) HYPERTENSION: ICD-10-CM

## 2023-01-01 DIAGNOSIS — N40.1 BENIGN PROSTATIC HYPERPLASIA WITH LOWER URINARY TRACT SYMPMS: ICD-10-CM

## 2023-01-01 DIAGNOSIS — G45.9 TRANSIENT CEREBRAL ISCHEMIC ATTACK, UNSPECIFIED: ICD-10-CM

## 2023-01-01 DIAGNOSIS — R26.89 OTHER ABNORMALITIES OF GAIT AND MOBILITY: ICD-10-CM

## 2023-01-01 DIAGNOSIS — R63.4 ABNORMAL WEIGHT LOSS: ICD-10-CM

## 2023-01-01 DIAGNOSIS — N13.8 BENIGN PROSTATIC HYPERPLASIA WITH LOWER URINARY TRACT SYMPMS: ICD-10-CM

## 2023-01-01 LAB
ALBUMIN SERPL ELPH-MCNC: 4.2 G/DL
ALP BLD-CCNC: 58 U/L
ALT SERPL-CCNC: 29 U/L
ANION GAP SERPL CALC-SCNC: 12 MMOL/L
AST SERPL-CCNC: 44 U/L
BILIRUB SERPL-MCNC: 0.8 MG/DL
BILIRUB UR QL STRIP: NORMAL
BUN SERPL-MCNC: 20 MG/DL
CALCIUM SERPL-MCNC: 9.6 MG/DL
CHLORIDE SERPL-SCNC: 104 MMOL/L
CLARITY UR: CLEAR
CO2 SERPL-SCNC: 19 MMOL/L
COLLECTION METHOD: NORMAL
CREAT SERPL-MCNC: 1.29 MG/DL
EGFR: 54 ML/MIN/1.73M2
ESTIMATED AVERAGE GLUCOSE: 105 MG/DL
FERRITIN SERPL-MCNC: 40 NG/ML
FOLATE SERPL-MCNC: 9.1 NG/ML
GLUCOSE SERPL-MCNC: 118 MG/DL
GLUCOSE UR-MCNC: NORMAL
HBA1C MFR BLD HPLC: 5.3 %
HCG UR QL: 1 EU/DL
HGB UR QL STRIP.AUTO: NORMAL
HOMOCYSTEINE LEVEL: 17.7 UMOL/L
IRON SATN MFR SERPL: 25 %
IRON SERPL-MCNC: 74 UG/DL
KETONES UR-MCNC: NORMAL
LEUKOCYTE ESTERASE UR QL STRIP: NORMAL
METHYLMALONIC ACID LEVEL: 182 NMOL/L
NITRITE UR QL STRIP: NORMAL
NT-PROBNP SERPL-MCNC: 167 PG/ML
PH UR STRIP: 5.5
POTASSIUM SERPL-SCNC: 5.3 MMOL/L
PROT SERPL-MCNC: 6.9 G/DL
PROT UR STRIP-MCNC: ABNORMAL
SODIUM SERPL-SCNC: 135 MMOL/L
SP GR UR STRIP: 1.02
T4 FREE SERPL-MCNC: 2.2 NG/DL
TIBC SERPL-MCNC: 294 UG/DL
TSH SERPL-ACNC: 0.98 UIU/ML
UIBC SERPL-MCNC: 220 UG/DL
VIT B12 SERPL-MCNC: 546 PG/ML

## 2023-01-01 PROCEDURE — 93784 AMBL BP MNTR W/SOFTWARE: CPT

## 2023-01-01 PROCEDURE — 93000 ELECTROCARDIOGRAM COMPLETE: CPT

## 2023-01-01 PROCEDURE — 99214 OFFICE O/P EST MOD 30 MIN: CPT | Mod: 25

## 2023-01-01 PROCEDURE — 99214 OFFICE O/P EST MOD 30 MIN: CPT

## 2023-01-01 PROCEDURE — 71260 CT THORAX DX C+: CPT

## 2023-01-01 PROCEDURE — 78830 RP LOCLZJ TUM SPECT W/CT 1: CPT | Mod: 26

## 2023-01-01 PROCEDURE — 71260 CT THORAX DX C+: CPT | Mod: 26

## 2023-01-01 PROCEDURE — 81003 URINALYSIS AUTO W/O SCOPE: CPT | Mod: QW

## 2023-01-01 PROCEDURE — A9538: CPT

## 2023-01-01 PROCEDURE — 93296 REM INTERROG EVL PM/IDS: CPT

## 2023-01-01 PROCEDURE — 74178 CT ABD&PLV WO CNTR FLWD CNTR: CPT

## 2023-01-01 PROCEDURE — 92014 COMPRE OPH EXAM EST PT 1/>: CPT

## 2023-01-01 PROCEDURE — 36415 COLL VENOUS BLD VENIPUNCTURE: CPT

## 2023-01-01 PROCEDURE — 74178 CT ABD&PLV WO CNTR FLWD CNTR: CPT | Mod: 26

## 2023-01-01 PROCEDURE — 99203 OFFICE O/P NEW LOW 30 MIN: CPT

## 2023-01-01 PROCEDURE — 93294 REM INTERROG EVL PM/LDLS PM: CPT

## 2023-01-01 PROCEDURE — 92133 CPTRZD OPH DX IMG PST SGM ON: CPT

## 2023-01-01 PROCEDURE — 78830 RP LOCLZJ TUM SPECT W/CT 1: CPT

## 2023-01-01 RX ORDER — HYDROCHLOROTHIAZIDE 25 MG/1
25 TABLET ORAL DAILY
Qty: 90 | Refills: 2 | Status: DISCONTINUED | COMMUNITY
Start: 2021-12-01 | End: 2023-01-01

## 2023-01-01 RX ORDER — ATORVASTATIN CALCIUM 40 MG/1
40 TABLET, FILM COATED ORAL
Qty: 90 | Refills: 3 | Status: ACTIVE | COMMUNITY
Start: 2022-05-06 | End: 1900-01-01

## 2023-01-01 RX ORDER — MIRABEGRON 25 MG/1
25 TABLET, FILM COATED, EXTENDED RELEASE ORAL
Refills: 0 | Status: ACTIVE | COMMUNITY

## 2023-01-01 RX ORDER — AMIODARONE HYDROCHLORIDE 200 MG/1
200 TABLET ORAL
Qty: 90 | Refills: 3 | Status: ACTIVE | COMMUNITY

## 2023-01-01 RX ORDER — NIFEDIPINE 30 MG/1
30 TABLET, EXTENDED RELEASE ORAL
Qty: 90 | Refills: 3 | Status: DISCONTINUED | COMMUNITY
Start: 2023-01-01 | End: 2023-01-01

## 2023-01-01 RX ORDER — FINASTERIDE 5 MG/1
5 TABLET, FILM COATED ORAL
Qty: 90 | Refills: 1 | Status: ACTIVE | COMMUNITY

## 2023-01-01 RX ORDER — METOPROLOL SUCCINATE 50 MG/1
50 TABLET, EXTENDED RELEASE ORAL DAILY
Qty: 90 | Refills: 3 | Status: DISCONTINUED | COMMUNITY
Start: 2021-11-10 | End: 2023-01-01

## 2023-01-01 RX ORDER — LISINOPRIL 20 MG/1
20 TABLET ORAL
Qty: 90 | Refills: 3 | Status: ACTIVE | COMMUNITY
Start: 1900-01-01 | End: 1900-01-01

## 2023-01-01 RX ORDER — TADALAFIL 10 MG/1
10 TABLET, FILM COATED ORAL
Refills: 0 | Status: DISCONTINUED | COMMUNITY
End: 2023-01-01

## 2023-01-01 RX ORDER — AMLODIPINE BESYLATE 2.5 MG/1
2.5 TABLET ORAL
Qty: 180 | Refills: 3 | Status: ACTIVE | COMMUNITY
Start: 2023-01-01 | End: 1900-01-01

## 2023-01-01 RX ORDER — ALFUZOSIN HYDROCHLORIDE 10 MG/1
10 TABLET, EXTENDED RELEASE ORAL DAILY
Qty: 90 | Refills: 3 | Status: DISCONTINUED | COMMUNITY
Start: 2020-12-01 | End: 2023-01-01

## 2023-01-01 RX ORDER — SPIRONOLACTONE 25 MG/1
25 TABLET ORAL
Qty: 90 | Refills: 3 | Status: DISCONTINUED | COMMUNITY
Start: 2023-01-11 | End: 2023-01-01

## 2023-01-10 RX ORDER — APIXABAN 5 MG/1
5 TABLET, FILM COATED ORAL
Qty: 180 | Refills: 3 | Status: ACTIVE | COMMUNITY
Start: 2020-02-10 | End: 1900-01-01

## 2023-01-11 ENCOUNTER — APPOINTMENT (OUTPATIENT)
Dept: HEART AND VASCULAR | Facility: CLINIC | Age: 88
End: 2023-01-11
Payer: MEDICARE

## 2023-01-11 VITALS — DIASTOLIC BLOOD PRESSURE: 80 MMHG | SYSTOLIC BLOOD PRESSURE: 140 MMHG

## 2023-01-11 VITALS
HEIGHT: 67.5 IN | OXYGEN SATURATION: 97 % | HEART RATE: 50 BPM | BODY MASS INDEX: 22.8 KG/M2 | TEMPERATURE: 97.7 F | WEIGHT: 147 LBS | DIASTOLIC BLOOD PRESSURE: 80 MMHG | SYSTOLIC BLOOD PRESSURE: 158 MMHG

## 2023-01-11 DIAGNOSIS — E87.6 HYPOKALEMIA: ICD-10-CM

## 2023-01-11 PROCEDURE — 99214 OFFICE O/P EST MOD 30 MIN: CPT | Mod: 25

## 2023-01-11 PROCEDURE — 93000 ELECTROCARDIOGRAM COMPLETE: CPT

## 2023-01-12 NOTE — ASSESSMENT
[FreeTextEntry1] : - afib on eliquis amiodarone at [facility]\par - contineu lisinopril 40 mg and hctz 25 mg daily \par - HTN add aldactone 25 mg daily \par - CAD on atorvastatin 40 mg daily \par - check labs\par - fu in 3 months \par

## 2023-01-12 NOTE — HISTORY OF PRESENT ILLNESS
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

## 2023-01-17 ENCOUNTER — NON-APPOINTMENT (OUTPATIENT)
Age: 88
End: 2023-01-17

## 2023-01-17 ENCOUNTER — APPOINTMENT (OUTPATIENT)
Dept: HEART AND VASCULAR | Facility: CLINIC | Age: 88
End: 2023-01-17
Payer: MEDICARE

## 2023-01-17 PROCEDURE — 93296 REM INTERROG EVL PM/IDS: CPT

## 2023-01-17 PROCEDURE — 93294 REM INTERROG EVL PM/LDLS PM: CPT

## 2023-01-23 ENCOUNTER — RX RENEWAL (OUTPATIENT)
Age: 88
End: 2023-01-23

## 2023-04-25 NOTE — PHYSICAL EXAM
[Well Nourished] : well nourished [Well Developed] : well developed [No Acute Distress] : no acute distress [Normal Venous Pressure] : normal venous pressure [No Carotid Bruit] : no carotid bruit [Normal S1, S2] : normal S1, S2 [No Murmur] : no murmur [No Rub] : no rub [No Gallop] : no gallop [Clear Lung Fields] : clear lung fields [Good Air Entry] : good air entry [No Respiratory Distress] : no respiratory distress  [Soft] : abdomen soft [Non Tender] : non-tender [No Masses/organomegaly] : no masses/organomegaly [Normal Bowel Sounds] : normal bowel sounds [Normal Gait] : normal gait [No Edema] : no edema cross cradle [No Cyanosis] : no cyanosis [No Clubbing] : no clubbing [No Varicosities] : no varicosities [No Rash] : no rash [No Skin Lesions] : no skin lesions [Moves all extremities] : moves all extremities [No Focal Deficits] : no focal deficits [Normal Speech] : normal speech [Alert and Oriented] : alert and oriented [Normal memory] : normal memory [General Appearance - Well Developed] : well developed [Normal Appearance] : normal appearance [Well Groomed] : well groomed [General Appearance - Well Nourished] : well nourished [No Deformities] : no deformities [General Appearance - In No Acute Distress] : no acute distress [Normal Conjunctiva] : the conjunctiva exhibited no abnormalities [Eyelids - No Xanthelasma] : the eyelids demonstrated no xanthelasmas [Normal Oral Mucosa] : normal oral mucosa [No Oral Pallor] : no oral pallor [No Oral Cyanosis] : no oral cyanosis [Normal Jugular Venous A Waves Present] : normal jugular venous A waves present [Normal Jugular Venous V Waves Present] : normal jugular venous V waves present [No Jugular Venous Wolfe A Waves] : no jugular venous wolfe A waves [Respiration, Rhythm And Depth] : normal respiratory rhythm and effort [Exaggerated Use Of Accessory Muscles For Inspiration] : no accessory muscle use [Auscultation Breath Sounds / Voice Sounds] : lungs were clear to auscultation bilaterally [Heart Rate And Rhythm] : heart rate and rhythm were normal [Heart Sounds] : normal S1 and S2 [Murmurs] : no murmurs present [Abdomen Soft] : soft [Abdomen Tenderness] : non-tender [Abdomen Mass (___ Cm)] : no abdominal mass palpated [Abnormal Walk] : normal gait [Gait - Sufficient For Exercise Testing] : the gait was sufficient for exercise testing [Nail Clubbing] : no clubbing of the fingernails [Cyanosis, Localized] : no localized cyanosis [Petechial Hemorrhages (___cm)] : no petechial hemorrhages [Skin Color & Pigmentation] : normal skin color and pigmentation [] : no rash [No Venous Stasis] : no venous stasis [Skin Lesions] : no skin lesions [No Skin Ulcers] : no skin ulcer [No Xanthoma] : no  xanthoma was observed [Oriented To Time, Place, And Person] : oriented to person, place, and time [Affect] : the affect was normal [Mood] : the mood was normal [No Anxiety] : not feeling anxious

## 2023-04-25 NOTE — HISTORY OF PRESENT ILLNESS
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

## 2023-04-25 NOTE — ASSESSMENT
[FreeTextEntry1] : - afib on eliquis amiodarone at [facility] reduce amiodarone to 200 mg 5 days a week due to decreased appetite and we will see if he has breakthrough afib\par - contineu lisinopril 40 mg and hctz 25 mg daily \par - HTN on aldactone 25 mg daily \par - CAD on atorvastatin 40 mg daily \par - labs done at pcp office \par - fu in 1 months \par

## 2023-06-13 PROBLEM — R63.4 UNINTENTIONAL WEIGHT LOSS: Status: ACTIVE | Noted: 2023-01-01

## 2023-06-13 NOTE — HISTORY OF PRESENT ILLNESS
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 bp at home was at as low as 70's systolic it has risent and he had a spike of bp to the 160's took a dose of metoprolol\par \par \par \par ecg sinus rhythm 6/13/2023 \par echo 7/2019 EF normal mild to moderate AI

## 2023-06-13 NOTE — ASSESSMENT
[FreeTextEntry1] : - bp is now normal off meds\par - Hold hctz 25, aldactone 25, lisinopril 40, metoprolol 50.\par - will check labs\par -repeat echo for eF given hypotension\par - ct chest abd pelvis due to unintentional weight loss\par - check cbc and tsh\par - fu after testing

## 2023-06-17 NOTE — DISCUSSION/SUMMARY
Heme/Onc
[FreeTextEntry1] : 85 year old male with CAD s/p stent, HLD, HTN, MERVIN (not on CPAP), history of prostate cancer, TIA, hypothyroidism and paroxysmal atrial fibrillation with tachy-ondina s/p pacemaker implant.   PAcemaker incision is well healed.  Interrogation reveals normal function and all measured data is within normal limits.  Rates 60-70 but not higher so rate response added.  He will follow up in 6 months or sooner if needed and knows to call with questions or concerns. 
Infectious Disease

## 2023-08-11 PROBLEM — E83.19 HEPATIC HEMOSIDEROSIS: Status: ACTIVE | Noted: 2023-01-01

## 2023-08-11 NOTE — ASSESSMENT
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  Plan: 1.  hepatic hemosiderosis --reviewed CT scan findings w/ patient --could be related to alcohol intake.   --r/o hemochromatosis.  Check ferritin, iron panel.  He just had labs at PCP office yesterday and did not want to undergo phlebotomy today.  He has an appt w/ cardiology next week and anticipates more blood work then.  I gave him lab orders and asked him to have these performed next week. --if he has evidence of iron overload on blood work - at his age, I would recommend expectant management and would not advocate for phlebotomy.  But in that scenario, his first degree relatives - in particular his son - should be screened for iron overload with ferritin level and cascade testing for hemochromatosis from there if abnormal.  all questions answered  will call w/ results hematology f/u PRN

## 2023-08-11 NOTE — HISTORY OF PRESENT ILLNESS
[de-identified] : Mr Starks  is an 88 year old man referred by Dr Wayne for hepatic hemosiderosis.  The patient had a CT scan performed in 6/2023.  The report stated "increased density hepatic parenchyma, probable underlying hemosiderosis."   He does not have a personal or family hx of hemochromatosis. No hx of liver disease.  LFTs normal on labs from the [facility] two days ago. CBC normal.  No hx of pRBC transfusions. Denies liver cancer, cirrhosis in the family.  PMH, psh reviewed in the chart. FMH - brother with colon ca;  father with metastatic cancer of unknown primary SH - he lives in [place].  retired.  + ETOH - presently 1.5 glasses of wine per day, but he recently cut back from larger amounts.  Son lives in [place].

## 2023-08-11 NOTE — PHYSICAL EXAM
[Normal] : affect appropriate [de-identified] : normal RR, breathing pattern [de-identified] : supple [de-identified] : normal HR [de-identified] : no edema [de-identified] : not distended

## 2023-08-15 PROBLEM — R06.02 SOBOE (SHORTNESS OF BREATH ON EXERTION): Status: ACTIVE | Noted: 2022-11-30

## 2023-08-16 NOTE — ASSESSMENT
[FreeTextEntry1] : - increase lisinopril to 20 mg bid - ambulatory blood pressure monitor - repeat echo for LVH and EF - based on findings will rule out TTR amyloid - afib on eliquis 5 mg bid and amiodarone 200 mg daily five days a week - fu in one month

## 2023-08-16 NOTE — HISTORY OF PRESENT ILLNESS
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 he has now regained the weight and bp spiked. pcp restarted lisinopril 20 mg daily. he feels well no complaints went back to ED for home bp in the 190's but seemed to have resolved on its own. no intervention.  bp at home ranges from 130's to 150's. he has been doubling and even tripling his lisinopril dose depending on his blood pressure    ecg sinus bradycardia 8/15/2023 echo 7/2019 EF normal mild to moderate AI

## 2023-08-24 NOTE — PROCEDURE
[FreeTextEntry1] : Placed ABPM cuff on right upper arm. Gave instructions for device function and proper fit. approx sleep period: 9:15p - 6a current BP meds: amlodipine 2.5mg daily, lisinopril 20mg BID

## 2023-08-31 NOTE — HISTORY OF PRESENT ILLNESS
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 Pt reports nocturia 5x night for 10 yrs, as well as hesitancy, incomplete bladder emptying, straining, and dribbling. Pt denies frequency, urgency, or incontinence. Pt also denies dysuria, hematuria, fever, or chills. Pt has tried finasteride, tadalafil, sildenafil, and alfuzosin in the past but they have not significantly improved is symptoms. History of GG1 CAP on AS with Dr. Kulkarni MRI 2020 demonstrated 58 cc gland   PVR 17 cc  His greatest bother is his nocturia He does drink fluids, coffee and wine in evenings and I recommended behavioral modification  For consideration of TURP/HoLEP or Rezum/Urolift, referred to Dr. Banks For consideration of PAE, referred to Dr. Kulkarni

## 2023-08-31 NOTE — ASSESSMENT
[FreeTextEntry1] : Stewart Carpio MD, FACS, FRCS   of Urology [facility] Director of Laparoscopic and Robotic Surgery  [facility] Director of Urology, [facility]  Professor of Urology  (Office) [phone] (Cell)  [phone]  [email or web address]  The total amount of time I have personally spent preparing for this visit, reviewing the patient's test results, obtaining external history, ordering tests/medications, documenting clinical information, communicating with and counseling the patient/family and/or caregiver(s), and spent face to face with the patient explaining the above was 50 minutes.

## 2023-10-02 PROBLEM — G45.9 TRANSIENT CEREBRAL ISCHEMIC ATTACK: Status: ACTIVE | Noted: 2021-06-23

## 2023-10-02 PROBLEM — R26.89 IMBALANCE: Status: ACTIVE | Noted: 2022-01-28

## 2023-10-03 PROBLEM — C61 PROSTATE CA: Status: ACTIVE | Noted: 2020-07-27

## 2023-10-03 PROBLEM — N40.1 BPH WITH OBSTRUCTION/LOWER URINARY TRACT SYMPTOMS: Status: ACTIVE | Noted: 2020-07-27

## 2023-11-01 PROBLEM — I10 BENIGN ESSENTIAL HYPERTENSION: Status: ACTIVE | Noted: 2017-12-21

## 2023-12-03 PROBLEM — I48.91 ATRIAL FIBRILLATION: Status: ACTIVE | Noted: 2020-06-10

## 2024-01-01 ENCOUNTER — APPOINTMENT (OUTPATIENT)
Dept: HEART AND VASCULAR | Facility: CLINIC | Age: 89
End: 2024-01-01

## 2024-02-21 ENCOUNTER — APPOINTMENT (OUTPATIENT)
Dept: HEART AND VASCULAR | Facility: CLINIC | Age: 89
End: 2024-02-21

## 2024-03-27 ENCOUNTER — APPOINTMENT (OUTPATIENT)
Dept: HEART AND VASCULAR | Facility: CLINIC | Age: 89
End: 2024-03-27

## 2024-04-30 ENCOUNTER — APPOINTMENT (OUTPATIENT)
Dept: HEART AND VASCULAR | Facility: CLINIC | Age: 89
End: 2024-04-30